# Patient Record
Sex: FEMALE | Race: WHITE | Employment: FULL TIME | URBAN - METROPOLITAN AREA
[De-identification: names, ages, dates, MRNs, and addresses within clinical notes are randomized per-mention and may not be internally consistent; named-entity substitution may affect disease eponyms.]

---

## 2020-02-27 ENCOUNTER — OFFICE VISIT (OUTPATIENT)
Dept: SURGERY | Facility: CLINIC | Age: 65
End: 2020-02-27
Payer: COMMERCIAL

## 2020-02-27 VITALS
RESPIRATION RATE: 17 BRPM | TEMPERATURE: 98 F | HEIGHT: 59 IN | DIASTOLIC BLOOD PRESSURE: 86 MMHG | SYSTOLIC BLOOD PRESSURE: 152 MMHG | BODY MASS INDEX: 21.17 KG/M2 | WEIGHT: 105 LBS | HEART RATE: 85 BPM

## 2020-02-27 DIAGNOSIS — R92.8 ABNORMAL MAMMOGRAM OF RIGHT BREAST: ICD-10-CM

## 2020-02-27 DIAGNOSIS — N63.10 BREAST MASS, RIGHT: Primary | ICD-10-CM

## 2020-02-27 DIAGNOSIS — R92.8 ABNORMAL ULTRASOUND OF BREAST: ICD-10-CM

## 2020-02-27 PROCEDURE — 99244 OFF/OP CNSLTJ NEW/EST MOD 40: CPT | Performed by: SURGERY

## 2020-02-27 RX ORDER — SULFAMETHOXAZOLE AND TRIMETHOPRIM 800; 160 MG/1; MG/1
1 TABLET ORAL 2 TIMES DAILY
COMMUNITY
End: 2020-03-13

## 2020-02-28 NOTE — H&P (VIEW-ONLY)
New Patient Visit Note       Active Problems      1. Breast mass, right    2. Abnormal mammogram of right breast    3.  Abnormal ultrasound of breast        Chief Complaint   Patient presents with:  Breast Problem: Np, referred by [facility] breast Biop and vomiting. Genitourinary: Negative for difficulty urinating, dysuria and frequency. Musculoskeletal: Negative for joint swelling and neck pain. Skin: Negative for color change and rash.    Neurological: Negative for dizziness, syncope and light-hea tenderness elicited. Left Breast-no dominant masses are noted   there is no abnormal dimpling or change in contour. There is no skin erythema, edema or peau d'orange. There is no nipple inversion and no discharge noted spontaneous or elicited.   No focal appropriate. Plan    Ultrasound core biopsy right breast x2 for tissue diagnosis         The risks, benefits, complications, possible outcomes and alternatives of the procedure were explained to the patient in detail.    Expected postoperative pain, r

## 2020-02-28 NOTE — H&P
New Patient Visit Note       Active Problems      1. Breast mass, right    2. Abnormal mammogram of right breast    3.  Abnormal ultrasound of breast        Chief Complaint   Patient presents with:  Breast Problem: Np, referred by [facility] breast Biop and vomiting. Genitourinary: Negative for difficulty urinating, dysuria and frequency. Musculoskeletal: Negative for joint swelling and neck pain. Skin: Negative for color change and rash.    Neurological: Negative for dizziness, syncope and light-hea tenderness elicited. Left Breast-no dominant masses are noted   there is no abnormal dimpling or change in contour. There is no skin erythema, edema or peau d'orange. There is no nipple inversion and no discharge noted spontaneous or elicited.   No focal appropriate. Plan    Ultrasound core biopsy right breast x2 for tissue diagnosis         The risks, benefits, complications, possible outcomes and alternatives of the procedure were explained to the patient in detail.    Expected postoperative pain, r

## 2020-03-03 ENCOUNTER — TELEPHONE (OUTPATIENT)
Dept: SURGERY | Facility: CLINIC | Age: 65
End: 2020-03-03

## 2020-03-03 ENCOUNTER — NURSE ONLY (OUTPATIENT)
Dept: MAMMOGRAPHY | Facility: HOSPITAL | Age: 65
End: 2020-03-03

## 2020-03-03 NOTE — IMAGING NOTE
Breast images/reports from [address] [place] [address] were reviewed by Dr Chelsie Marks and recommendation was made for pt to have a U/S right breast biopsy x 2 sites and right axillary lymph-node biopsy.  This information was relayed to Dr Luis Eduardo Ferguson and she states she concurs and will plac

## 2020-03-03 NOTE — TELEPHONE ENCOUNTER
Called by radiology-patient did have right breast ultrasound and right axillary ultrasound. 2 lesions noted in the right breast for which ultrasound core biopsy is recommended. Also right axillary ultrasound noted enlarged axillary lymph nodes.   Will add

## 2020-03-04 ENCOUNTER — TELEPHONE (OUTPATIENT)
Dept: SURGERY | Facility: CLINIC | Age: 65
End: 2020-03-04

## 2020-03-04 ENCOUNTER — TELEPHONE (OUTPATIENT)
Dept: MAMMOGRAPHY | Facility: HOSPITAL | Age: 65
End: 2020-03-04

## 2020-03-04 NOTE — TELEPHONE ENCOUNTER
Spoke with pt re:Right breast Ultrasound biopsy x 2 sites and right axillary lymph-node biopsy at the recommendation of Dr Cecily Lyons. Previous images and reports uploaded in PACS and reviewed by Dr Joe Douglas, BIRADS 5.  Procedure reviewed and written instructions

## 2020-03-04 NOTE — TELEPHONE ENCOUNTER
Pt calling back and has gone through all instructions for breast bx with the radiology dept. Asked that once she schedules this to call for an appt post bx for results. Pt ARIC.

## 2020-03-11 ENCOUNTER — HOSPITAL ENCOUNTER (OUTPATIENT)
Dept: MAMMOGRAPHY | Facility: HOSPITAL | Age: 65
Discharge: HOME | End: 2020-03-11
Attending: SURGERY
Payer: COMMERCIAL

## 2020-03-11 DIAGNOSIS — R92.8 ABNORMAL MAMMOGRAM: ICD-10-CM

## 2020-03-11 DIAGNOSIS — N63.0 BREAST MASS: ICD-10-CM

## 2020-03-11 PROCEDURE — 38505 NEEDLE BIOPSY LYMPH NODES: CPT | Performed by: SURGERY

## 2020-03-11 PROCEDURE — 19083 BX BREAST 1ST LESION US IMAG: CPT | Performed by: SURGERY

## 2020-03-11 PROCEDURE — 88305 TISSUE EXAM BY PATHOLOGIST: CPT | Performed by: SURGERY

## 2020-03-11 PROCEDURE — 19084 BX BREAST ADD LESION US IMAG: CPT | Performed by: SURGERY

## 2020-03-11 PROCEDURE — 76942 ECHO GUIDE FOR BIOPSY: CPT | Performed by: SURGERY

## 2020-03-11 PROCEDURE — 77065 DX MAMMO INCL CAD UNI: CPT | Performed by: SURGERY

## 2020-03-11 PROCEDURE — 88360 TUMOR IMMUNOHISTOCHEM/MANUAL: CPT | Performed by: SURGERY

## 2020-03-13 ENCOUNTER — NURSE NAVIGATOR ENCOUNTER (OUTPATIENT)
Dept: HEMATOLOGY/ONCOLOGY | Facility: HOSPITAL | Age: 65
End: 2020-03-13

## 2020-03-13 ENCOUNTER — OFFICE VISIT (OUTPATIENT)
Dept: SURGERY | Facility: CLINIC | Age: 65
End: 2020-03-13
Payer: COMMERCIAL

## 2020-03-13 VITALS
RESPIRATION RATE: 16 BRPM | HEART RATE: 91 BPM | HEIGHT: 60.5 IN | DIASTOLIC BLOOD PRESSURE: 92 MMHG | WEIGHT: 105 LBS | BODY MASS INDEX: 20.08 KG/M2 | SYSTOLIC BLOOD PRESSURE: 152 MMHG | TEMPERATURE: 98 F

## 2020-03-13 DIAGNOSIS — C50.911 MALIGNANT NEOPLASM OF RIGHT FEMALE BREAST, UNSPECIFIED ESTROGEN RECEPTOR STATUS, UNSPECIFIED SITE OF BREAST (HCC): Primary | ICD-10-CM

## 2020-03-13 DIAGNOSIS — C50.911 BREAST CANCER METASTASIZED TO AXILLARY LYMPH NODE, RIGHT (HCC): ICD-10-CM

## 2020-03-13 DIAGNOSIS — C77.3 BREAST CANCER METASTASIZED TO AXILLARY LYMPH NODE, RIGHT (HCC): ICD-10-CM

## 2020-03-13 PROCEDURE — 99244 OFF/OP CNSLTJ NEW/EST MOD 40: CPT | Performed by: SURGERY

## 2020-03-13 NOTE — PROGRESS NOTES
Phoned patient to discuss my role as breast program navigator and to assist with scheduling with medical oncology. Contact information provided. Will await patient's return phone call. Pt returned phone call.  Introduced myself as the breast program anju

## 2020-03-17 ENCOUNTER — MED REC SCAN ONLY (OUTPATIENT)
Dept: SURGERY | Facility: CLINIC | Age: 65
End: 2020-03-17

## 2020-03-17 ENCOUNTER — TELEPHONE (OUTPATIENT)
Dept: SURGERY | Facility: CLINIC | Age: 65
End: 2020-03-17

## 2020-03-17 ENCOUNTER — NURSE NAVIGATOR ENCOUNTER (OUTPATIENT)
Dept: HEMATOLOGY/ONCOLOGY | Facility: HOSPITAL | Age: 65
End: 2020-03-17

## 2020-03-17 ENCOUNTER — OFFICE VISIT (OUTPATIENT)
Dept: HEMATOLOGY/ONCOLOGY | Facility: HOSPITAL | Age: 65
End: 2020-03-17
Attending: INTERNAL MEDICINE
Payer: COMMERCIAL

## 2020-03-17 VITALS
HEART RATE: 86 BPM | BODY MASS INDEX: 21.41 KG/M2 | DIASTOLIC BLOOD PRESSURE: 91 MMHG | WEIGHT: 102 LBS | HEIGHT: 57.72 IN | TEMPERATURE: 99 F | RESPIRATION RATE: 16 BRPM | SYSTOLIC BLOOD PRESSURE: 180 MMHG | OXYGEN SATURATION: 96 %

## 2020-03-17 DIAGNOSIS — R74.8 ELEVATED ALKALINE PHOSPHATASE IN NEWBORN: ICD-10-CM

## 2020-03-17 DIAGNOSIS — R79.89 ELEVATED LFTS: ICD-10-CM

## 2020-03-17 DIAGNOSIS — C50.811 MALIGNANT NEOPLASM OF OVERLAPPING SITES OF RIGHT BREAST IN FEMALE, ESTROGEN RECEPTOR POSITIVE (HCC): Primary | ICD-10-CM

## 2020-03-17 DIAGNOSIS — C50.911 BREAST CANCER METASTASIZED TO AXILLARY LYMPH NODE, RIGHT (HCC): ICD-10-CM

## 2020-03-17 DIAGNOSIS — C77.3 BREAST CANCER METASTASIZED TO AXILLARY LYMPH NODE, RIGHT (HCC): ICD-10-CM

## 2020-03-17 DIAGNOSIS — L53.9 BREAST ERYTHEMA: ICD-10-CM

## 2020-03-17 DIAGNOSIS — Z17.0 MALIGNANT NEOPLASM OF OVERLAPPING SITES OF RIGHT BREAST IN FEMALE, ESTROGEN RECEPTOR POSITIVE (HCC): Primary | ICD-10-CM

## 2020-03-17 LAB
ALBUMIN SERPL-MCNC: 3.7 G/DL (ref 3.4–5)
ALBUMIN/GLOB SERPL: 1 {RATIO} (ref 1–2)
ALP LIVER SERPL-CCNC: 331 U/L (ref 50–130)
ALT SERPL-CCNC: 369 U/L (ref 13–56)
ANION GAP SERPL CALC-SCNC: 5 MMOL/L (ref 0–18)
AST SERPL-CCNC: 68 U/L (ref 15–37)
BASOPHILS # BLD AUTO: 0.03 X10(3) UL (ref 0–0.2)
BASOPHILS NFR BLD AUTO: 0.4 %
BILIRUB SERPL-MCNC: 0.8 MG/DL (ref 0.1–2)
BRCA1 C.185DELAG BLD/T QL: 47.3 U/ML (ref ?–38)
BUN BLD-MCNC: 16 MG/DL (ref 7–18)
BUN/CREAT SERPL: 20.3 (ref 10–20)
CALCIUM BLD-MCNC: 9.1 MG/DL (ref 8.5–10.1)
CHLORIDE SERPL-SCNC: 106 MMOL/L (ref 98–112)
CO2 SERPL-SCNC: 28 MMOL/L (ref 21–32)
CREAT BLD-MCNC: 0.79 MG/DL (ref 0.55–1.02)
DEPRECATED RDW RBC AUTO: 47.2 FL (ref 35.1–46.3)
EOSINOPHIL # BLD AUTO: 0.06 X10(3) UL (ref 0–0.7)
EOSINOPHIL NFR BLD AUTO: 0.8 %
ERYTHROCYTE [DISTWIDTH] IN BLOOD BY AUTOMATED COUNT: 14.7 % (ref 11–15)
GLOBULIN PLAS-MCNC: 3.8 G/DL (ref 2.8–4.4)
GLUCOSE BLD-MCNC: 89 MG/DL (ref 70–99)
HCT VFR BLD AUTO: 41.9 % (ref 35–48)
HGB BLD-MCNC: 13.4 G/DL (ref 12–16)
IMM GRANULOCYTES # BLD AUTO: 0.04 X10(3) UL (ref 0–1)
IMM GRANULOCYTES NFR BLD: 0.5 %
LDH SERPL L TO P-CCNC: 180 U/L
LYMPHOCYTES # BLD AUTO: 1.75 X10(3) UL (ref 1–4)
LYMPHOCYTES NFR BLD AUTO: 23.4 %
M PROTEIN MFR SERPL ELPH: 7.5 G/DL (ref 6.4–8.2)
MCH RBC QN AUTO: 29 PG (ref 26–34)
MCHC RBC AUTO-ENTMCNC: 32 G/DL (ref 31–37)
MCV RBC AUTO: 90.7 FL (ref 80–100)
MONOCYTES # BLD AUTO: 0.71 X10(3) UL (ref 0.1–1)
MONOCYTES NFR BLD AUTO: 9.5 %
NEUTROPHILS # BLD AUTO: 4.88 X10 (3) UL (ref 1.5–7.7)
NEUTROPHILS # BLD AUTO: 4.88 X10(3) UL (ref 1.5–7.7)
NEUTROPHILS NFR BLD AUTO: 65.4 %
OSMOLALITY SERPL CALC.SUM OF ELEC: 289 MOSM/KG (ref 275–295)
PLATELET # BLD AUTO: 365 10(3)UL (ref 150–450)
POTASSIUM SERPL-SCNC: 4.2 MMOL/L (ref 3.5–5.1)
RBC # BLD AUTO: 4.62 X10(6)UL (ref 3.8–5.3)
SODIUM SERPL-SCNC: 139 MMOL/L (ref 136–145)
WBC # BLD AUTO: 7.5 X10(3) UL (ref 4–11)

## 2020-03-17 PROCEDURE — 99245 OFF/OP CONSLTJ NEW/EST HI 55: CPT | Performed by: INTERNAL MEDICINE

## 2020-03-17 NOTE — CONSULTS
Cancer Center Report of Consultation    Patient Name: Chad Guallpa   YOB: 1955   Medical Record Number: [id number]   CSN: [id number]   Consulting Physician: Adolfo Hill MD  Referring Physician(s): Benjy Abbasi  Date of Consultation: History:  OB History    Comment: Menarche at age 8  Menopause at age 48  First full term pregnancy at age 41    Psychosocial History:  Social History    Socioeconomic History      Marital status:       Spouse name: Not on file      Number of childr medications on file. Review of Systems:    Constitutional: Negative for anorexia, fatigue, fevers, chills, night sweats and weight loss. Eyes: Negative for visual disturbance, irritation and redness.   Respiratory: Negative for cough, hemoptysis, chest deficit. Lymphatics: There is no palpable lymphadenopathy throughout in the cervical, supraclavicular, or axillary regions. There is fullness in the right axilla.     Labs reviewed at this visit:  Lab Results   Component Value Date    WBC 7.5 03/17/2020 Multiple samples were obtained using the vacuum-assisted device. A clip was then deployed and post procedure mammogram demonstrated the clip to be in the appropriate position.  Post procedural mammogram performed on separate digital equipment show the echocardiogram.     If she proves to have distant metastases then we would have to reconsider the plan. She has a good understanding of the plan and all questions were answered.     Visit start time 11:26 AM     Surinder Yusuf MD

## 2020-03-17 NOTE — PROGRESS NOTES
Patient is here today for Consult  with Dr Azeb Hicks . Patient denies pain. Medication list and medical history were reviewed and updated.      Education Record    Learner:  Patient    Disease / Diagnosis: Consult Breast Clinic    Barriers / Limitations:  None

## 2020-03-17 NOTE — TELEPHONE ENCOUNTER
Spoke to Kapil andrews today after tumor board conference  Kapil andrews did see Dr. Rik Lara earlier today to discuss metastatic work-up  Patient is scheduled for Port-A-Cath placement March 25 next week  Patient is a candidate for genetic testing due to Tressa castro

## 2020-03-18 ENCOUNTER — MED REC SCAN ONLY (OUTPATIENT)
Dept: SURGERY | Facility: CLINIC | Age: 65
End: 2020-03-18

## 2020-03-24 ENCOUNTER — NURSE NAVIGATOR ENCOUNTER (OUTPATIENT)
Dept: HEMATOLOGY/ONCOLOGY | Facility: HOSPITAL | Age: 65
End: 2020-03-24

## 2020-03-24 DIAGNOSIS — C50.811 MALIGNANT NEOPLASM OF OVERLAPPING SITES OF RIGHT BREAST IN FEMALE, ESTROGEN RECEPTOR POSITIVE (HCC): Primary | ICD-10-CM

## 2020-03-24 DIAGNOSIS — R74.8 ELEVATED ALKALINE PHOSPHATASE IN NEWBORN: ICD-10-CM

## 2020-03-24 DIAGNOSIS — R79.89 ELEVATED LFTS: ICD-10-CM

## 2020-03-24 DIAGNOSIS — Z17.0 MALIGNANT NEOPLASM OF OVERLAPPING SITES OF RIGHT BREAST IN FEMALE, ESTROGEN RECEPTOR POSITIVE (HCC): Primary | ICD-10-CM

## 2020-03-24 DIAGNOSIS — C50.911 BREAST CANCER METASTASIZED TO AXILLARY LYMPH NODE, RIGHT (HCC): ICD-10-CM

## 2020-03-24 DIAGNOSIS — C77.3 BREAST CANCER METASTASIZED TO AXILLARY LYMPH NODE, RIGHT (HCC): ICD-10-CM

## 2020-03-24 NOTE — PROGRESS NOTES
Orders changed to CT and bone scan for evaluation of metastatic disease. Will proceed with PAC placement on 3/25, and staging scheduled for 3/27/2020. Phoned patient to discuss instructions and directions.  Pt has PAC insertion tomorrow and will  ora

## 2020-03-25 ENCOUNTER — TELEPHONE (OUTPATIENT)
Dept: HEMATOLOGY/ONCOLOGY | Facility: HOSPITAL | Age: 65
End: 2020-03-25

## 2020-03-25 ENCOUNTER — APPOINTMENT (OUTPATIENT)
Dept: GENERAL RADIOLOGY | Facility: HOSPITAL | Age: 65
End: 2020-03-25
Attending: SURGERY
Payer: COMMERCIAL

## 2020-03-25 ENCOUNTER — MED REC SCAN ONLY (OUTPATIENT)
Dept: SURGERY | Facility: CLINIC | Age: 65
End: 2020-03-25

## 2020-03-25 ENCOUNTER — HOSPITAL ENCOUNTER (OUTPATIENT)
Facility: HOSPITAL | Age: 65
Setting detail: HOSPITAL OUTPATIENT SURGERY
Discharge: HOME | End: 2020-03-25
Attending: SURGERY | Admitting: SURGERY
Payer: COMMERCIAL

## 2020-03-25 ENCOUNTER — ANESTHESIA (OUTPATIENT)
Dept: SURGERY | Facility: HOSPITAL | Age: 65
End: 2020-03-25
Payer: COMMERCIAL

## 2020-03-25 ENCOUNTER — ANESTHESIA EVENT (OUTPATIENT)
Dept: SURGERY | Facility: HOSPITAL | Age: 65
End: 2020-03-25
Payer: COMMERCIAL

## 2020-03-25 VITALS
TEMPERATURE: 100 F | SYSTOLIC BLOOD PRESSURE: 143 MMHG | DIASTOLIC BLOOD PRESSURE: 88 MMHG | OXYGEN SATURATION: 99 % | WEIGHT: 103.19 LBS | HEART RATE: 71 BPM | BODY MASS INDEX: 21.66 KG/M2 | RESPIRATION RATE: 18 BRPM | HEIGHT: 58 IN

## 2020-03-25 DIAGNOSIS — C50.911 MALIGNANT NEOPLASM OF RIGHT FEMALE BREAST, UNSPECIFIED ESTROGEN RECEPTOR STATUS, UNSPECIFIED SITE OF BREAST (HCC): ICD-10-CM

## 2020-03-25 PROCEDURE — 02HV33Z INSERTION OF INFUSION DEVICE INTO SUPERIOR VENA CAVA, PERCUTANEOUS APPROACH: ICD-10-PCS | Performed by: SURGERY

## 2020-03-25 PROCEDURE — 0JHF3XZ INSERTION OF TUNNELED VASCULAR ACCESS DEVICE INTO LEFT UPPER ARM SUBCUTANEOUS TISSUE AND FASCIA, PERCUTANEOUS APPROACH: ICD-10-PCS | Performed by: SURGERY

## 2020-03-25 PROCEDURE — 77001 FLUOROGUIDE FOR VEIN DEVICE: CPT | Performed by: SURGERY

## 2020-03-25 PROCEDURE — 71045 X-RAY EXAM CHEST 1 VIEW: CPT | Performed by: SURGERY

## 2020-03-25 DEVICE — POWERPORT CLEARVUE SLIM WITH SMOOTH SEPTUM, 8F POLYURETHANE CATHETER, SUTURE PLUGS, INTERMEDIATE KIT
Type: IMPLANTABLE DEVICE | Site: CHEST | Status: FUNCTIONAL
Brand: POWERPORT CLEARVUE

## 2020-03-25 RX ORDER — CEFAZOLIN SODIUM/WATER 2 G/20 ML
2 SYRINGE (ML) INTRAVENOUS ONCE
Status: COMPLETED | OUTPATIENT
Start: 2020-03-25 | End: 2020-03-25

## 2020-03-25 RX ORDER — NALOXONE HYDROCHLORIDE 0.4 MG/ML
80 INJECTION, SOLUTION INTRAMUSCULAR; INTRAVENOUS; SUBCUTANEOUS AS NEEDED
Status: DISCONTINUED | OUTPATIENT
Start: 2020-03-25 | End: 2020-03-25

## 2020-03-25 RX ORDER — SODIUM CHLORIDE, SODIUM LACTATE, POTASSIUM CHLORIDE, CALCIUM CHLORIDE 600; 310; 30; 20 MG/100ML; MG/100ML; MG/100ML; MG/100ML
INJECTION, SOLUTION INTRAVENOUS CONTINUOUS
Status: DISCONTINUED | OUTPATIENT
Start: 2020-03-25 | End: 2020-03-25

## 2020-03-25 RX ORDER — HEPARIN SODIUM 5000 [USP'U]/ML
5000 INJECTION, SOLUTION INTRAVENOUS; SUBCUTANEOUS ONCE
Status: COMPLETED | OUTPATIENT
Start: 2020-03-25 | End: 2020-03-25

## 2020-03-25 RX ORDER — BUPIVACAINE HYDROCHLORIDE 5 MG/ML
INJECTION, SOLUTION EPIDURAL; INTRACAUDAL AS NEEDED
Status: DISCONTINUED | OUTPATIENT
Start: 2020-03-25 | End: 2020-03-25 | Stop reason: HOSPADM

## 2020-03-25 RX ORDER — LIDOCAINE HYDROCHLORIDE AND EPINEPHRINE 10; 10 MG/ML; UG/ML
INJECTION, SOLUTION INFILTRATION; PERINEURAL AS NEEDED
Status: DISCONTINUED | OUTPATIENT
Start: 2020-03-25 | End: 2020-03-25 | Stop reason: HOSPADM

## 2020-03-25 RX ORDER — HYDROCODONE BITARTRATE AND ACETAMINOPHEN 5; 325 MG/1; MG/1
2 TABLET ORAL AS NEEDED
Status: DISCONTINUED | OUTPATIENT
Start: 2020-03-25 | End: 2020-03-25

## 2020-03-25 RX ORDER — HYDROMORPHONE HYDROCHLORIDE 1 MG/ML
0.4 INJECTION, SOLUTION INTRAMUSCULAR; INTRAVENOUS; SUBCUTANEOUS EVERY 5 MIN PRN
Status: DISCONTINUED | OUTPATIENT
Start: 2020-03-25 | End: 2020-03-25

## 2020-03-25 RX ORDER — LIDOCAINE HYDROCHLORIDE 10 MG/ML
INJECTION, SOLUTION EPIDURAL; INFILTRATION; INTRACAUDAL; PERINEURAL AS NEEDED
Status: DISCONTINUED | OUTPATIENT
Start: 2020-03-25 | End: 2020-03-25 | Stop reason: SURG

## 2020-03-25 RX ORDER — ONDANSETRON 2 MG/ML
4 INJECTION INTRAMUSCULAR; INTRAVENOUS AS NEEDED
Status: DISCONTINUED | OUTPATIENT
Start: 2020-03-25 | End: 2020-03-25

## 2020-03-25 RX ORDER — ACETAMINOPHEN 500 MG
1000 TABLET ORAL ONCE
Status: DISCONTINUED | OUTPATIENT
Start: 2020-03-25 | End: 2020-03-25

## 2020-03-25 RX ORDER — ACETAMINOPHEN 500 MG
1000 TABLET ORAL ONCE AS NEEDED
Status: DISCONTINUED | OUTPATIENT
Start: 2020-03-25 | End: 2020-03-25

## 2020-03-25 RX ORDER — ACETAMINOPHEN 500 MG
1000 TABLET ORAL EVERY 6 HOURS PRN
COMMUNITY
End: 2020-07-28 | Stop reason: ALTCHOICE

## 2020-03-25 RX ORDER — HYDROCODONE BITARTRATE AND ACETAMINOPHEN 5; 325 MG/1; MG/1
1 TABLET ORAL AS NEEDED
Status: DISCONTINUED | OUTPATIENT
Start: 2020-03-25 | End: 2020-03-25

## 2020-03-25 RX ADMIN — LIDOCAINE HYDROCHLORIDE 50 MG: 10 INJECTION, SOLUTION EPIDURAL; INFILTRATION; INTRACAUDAL; PERINEURAL at 07:54:00

## 2020-03-25 RX ADMIN — CEFAZOLIN SODIUM/WATER 2 G: 2 G/20 ML SYRINGE (ML) INTRAVENOUS at 07:51:00

## 2020-03-25 NOTE — TELEPHONE ENCOUNTER
Sheila called saying she got a call that she needed to reschedule her scan due to the virus if it is not an urgent matter. She wanted to verify with  that this wasn't urgent, or if he needed her to get it done on Monday.  Please call

## 2020-03-25 NOTE — OR NURSING
PATIENT WAS INSTRUCTED ABOUT PROCEDURES TO BE DONE Friday.  TOLD WHAT BUILDING TO GO TO [facility] TIME TO ARRIVE AND TIME OF PROCEDURES. VERBALIZED UNDERSTANDING

## 2020-03-25 NOTE — ANESTHESIA POSTPROCEDURE EVALUATION
Barbie 74 Patient Status:  Hospital Outpatient Surgery   Age/Gender 60year old female MRN [id number]   [facility] SURGERY Attending Biju Choi, [address] [facility] [address] Day # 0 PCP Dajuan Shankar MD       Anesthesia Post-o

## 2020-03-25 NOTE — BRIEF OP NOTE
Pre-Operative Diagnosis: Malignant neoplasm of right female breast, unspecified estrogen receptor status, [facility] [C50.911]     Post-Operative Diagnosis: Malignant neoplasm of right female breast, unspecified estrogen receptor stat

## 2020-03-25 NOTE — ANESTHESIA PREPROCEDURE EVALUATION
PRE-OP EVALUATION    Patient Name: Alberto Singh    Pre-op Diagnosis: Malignant neoplasm of right female breast, unspecified estrogen receptor status, unspecified site of breast [facility] [C50.911]    Procedure(s):  INSERTION LEFT PORTACATH    Surgeon(s) 41.9 03/17/2020    MCV 90.7 03/17/2020    MCH 29.0 03/17/2020    MCHC 32.0 03/17/2020    RDW 14.7 03/17/2020    .0 03/17/2020     Lab Results   Component Value Date     03/17/2020    K 4.2 03/17/2020     03/17/2020    CO2 28.0 03/17/2020

## 2020-03-25 NOTE — OPERATIVE REPORT
[facility]  Operative Note    [place] Location: OR   CSN [id number] MRN [id number]   Admission Date 3/25/2020 Operation Date 3/25/2020   Attending Physician Meliza Kinney MD Operating Physician Joanie Coombs MD     Date of procedure:   3 HER-2 positive    Case was discussed in multidisciplinary tumor board conference. At this time the patient is a candidate for neoadjuvant chemotherapy. Chace Price is admitted today for placement of Port-A-Cath.     Discussed with patient:  The potential risks into the subcutaneous pocket using a nonabsorbable suture. All skin incisions were closed with 3-0 and 5-0 Vicryl in a layered subcuticular manner. All sponge instrument and needle counts were correct at the conclusion of the procedure.  The patient did julieth

## 2020-03-27 ENCOUNTER — HOSPITAL ENCOUNTER (OUTPATIENT)
Dept: NUCLEAR MEDICINE | Facility: HOSPITAL | Age: 65
Discharge: HOME | End: 2020-03-27
Attending: INTERNAL MEDICINE
Payer: COMMERCIAL

## 2020-03-27 ENCOUNTER — HOSPITAL ENCOUNTER (OUTPATIENT)
Dept: CT IMAGING | Facility: HOSPITAL | Age: 65
Discharge: HOME | End: 2020-03-27
Attending: INTERNAL MEDICINE
Payer: COMMERCIAL

## 2020-03-27 DIAGNOSIS — R79.89 ELEVATED LFTS: ICD-10-CM

## 2020-03-27 DIAGNOSIS — C50.811 MALIGNANT NEOPLASM OF OVERLAPPING SITES OF RIGHT BREAST IN FEMALE, ESTROGEN RECEPTOR POSITIVE (HCC): ICD-10-CM

## 2020-03-27 DIAGNOSIS — R74.8 ELEVATED ALKALINE PHOSPHATASE IN NEWBORN: ICD-10-CM

## 2020-03-27 DIAGNOSIS — Z17.0 MALIGNANT NEOPLASM OF OVERLAPPING SITES OF RIGHT BREAST IN FEMALE, ESTROGEN RECEPTOR POSITIVE (HCC): ICD-10-CM

## 2020-03-27 PROCEDURE — 74177 CT ABD & PELVIS W/CONTRAST: CPT | Performed by: INTERNAL MEDICINE

## 2020-03-27 PROCEDURE — 78306 BONE IMAGING WHOLE BODY: CPT | Performed by: INTERNAL MEDICINE

## 2020-03-27 PROCEDURE — 78832 RP LOCLZJ TUM SPECT W/CT 2: CPT | Performed by: INTERNAL MEDICINE

## 2020-03-27 PROCEDURE — 71260 CT THORAX DX C+: CPT | Performed by: INTERNAL MEDICINE

## 2020-03-30 ENCOUNTER — HOSPITAL ENCOUNTER (OUTPATIENT)
Dept: CV DIAGNOSTICS | Facility: HOSPITAL | Age: 65
Discharge: HOME | End: 2020-03-30
Attending: INTERNAL MEDICINE
Payer: COMMERCIAL

## 2020-03-30 ENCOUNTER — NURSE NAVIGATOR ENCOUNTER (OUTPATIENT)
Dept: HEMATOLOGY/ONCOLOGY | Facility: HOSPITAL | Age: 65
End: 2020-03-30

## 2020-03-30 DIAGNOSIS — C50.911 BREAST CANCER METASTASIZED TO AXILLARY LYMPH NODE, RIGHT (HCC): ICD-10-CM

## 2020-03-30 DIAGNOSIS — C50.811 MALIGNANT NEOPLASM OF OVERLAPPING SITES OF RIGHT BREAST IN FEMALE, ESTROGEN RECEPTOR POSITIVE (HCC): ICD-10-CM

## 2020-03-30 DIAGNOSIS — Z17.0 MALIGNANT NEOPLASM OF OVERLAPPING SITES OF RIGHT BREAST IN FEMALE, ESTROGEN RECEPTOR POSITIVE (HCC): ICD-10-CM

## 2020-03-30 DIAGNOSIS — C77.3 BREAST CANCER METASTASIZED TO AXILLARY LYMPH NODE, RIGHT (HCC): ICD-10-CM

## 2020-03-30 PROCEDURE — 93306 TTE W/DOPPLER COMPLETE: CPT | Performed by: INTERNAL MEDICINE

## 2020-03-30 NOTE — PROGRESS NOTES
LVM for patient to ensure that she received Dr. Yvonne Crow message regarding bone scan being negative. Echo is today. Plan to proceed with [facility], requested return phone call, contact information provided. Will assist in scheduling chemo ed and first treatment.

## 2020-03-31 ENCOUNTER — HOSPITAL ENCOUNTER (OUTPATIENT)
Dept: NUCLEAR MEDICINE | Facility: HOSPITAL | Age: 65
End: 2020-03-31
Attending: INTERNAL MEDICINE
Payer: COMMERCIAL

## 2020-04-01 ENCOUNTER — VIRTUAL PHONE E/M (OUTPATIENT)
Dept: HEMATOLOGY/ONCOLOGY | Facility: HOSPITAL | Age: 65
End: 2020-04-01
Attending: INTERNAL MEDICINE
Payer: COMMERCIAL

## 2020-04-01 DIAGNOSIS — C77.3 BREAST CANCER METASTASIZED TO AXILLARY LYMPH NODE, RIGHT (HCC): ICD-10-CM

## 2020-04-01 DIAGNOSIS — Z17.0 MALIGNANT NEOPLASM OF OVERLAPPING SITES OF RIGHT BREAST IN FEMALE, ESTROGEN RECEPTOR POSITIVE (HCC): Primary | ICD-10-CM

## 2020-04-01 DIAGNOSIS — C50.911 BREAST CANCER METASTASIZED TO AXILLARY LYMPH NODE, RIGHT (HCC): ICD-10-CM

## 2020-04-01 DIAGNOSIS — C50.811 MALIGNANT NEOPLASM OF OVERLAPPING SITES OF RIGHT BREAST IN FEMALE, ESTROGEN RECEPTOR POSITIVE (HCC): Primary | ICD-10-CM

## 2020-04-01 RX ORDER — PROCHLORPERAZINE MALEATE 10 MG
10 TABLET ORAL EVERY 6 HOURS PRN
Qty: 30 TABLET | Refills: 3 | Status: SHIPPED | OUTPATIENT
Start: 2020-04-01 | End: 2021-06-03 | Stop reason: ALTCHOICE

## 2020-04-01 RX ORDER — DEXAMETHASONE 4 MG/1
8 TABLET ORAL 2 TIMES DAILY WITH MEALS
Qty: 24 TABLET | Refills: 4 | Status: SHIPPED | OUTPATIENT
Start: 2020-04-01 | End: 2020-08-27

## 2020-04-01 RX ORDER — ONDANSETRON HYDROCHLORIDE 8 MG/1
8 TABLET, FILM COATED ORAL EVERY 8 HOURS PRN
Qty: 30 TABLET | Refills: 3 | Status: SHIPPED | OUTPATIENT
Start: 2020-04-01 | End: 2021-12-09

## 2020-04-01 NOTE — TELEPHONE ENCOUNTER
Chemotherapy Education    Learner:  Patient    Barriers / Limitations:  None    Chemotherapy education goals:  · Learn the drug names  · Administration schedule  · Routes of administration  · Treatment setting    Drug names:  Docetaxel, carboplatin, trastu MD/RN of any numbness / tingling at next visit:  Needs reinforcement    Comments: No baseline neuropathy, will monitor.     Treatment Effects on the Bladder and Kidneys:    Function of the kidneys and bladder:  Needs reinforcement    Signs / symptoms of hem Resources Information sheet    Patient was given ample opportunity to ask questions. All questions and concerns addressed. We discussed self care techniques, symptom management, fluid, diet, and activities.

## 2020-04-02 ENCOUNTER — OFFICE VISIT (OUTPATIENT)
Dept: HEMATOLOGY/ONCOLOGY | Facility: HOSPITAL | Age: 65
End: 2020-04-02
Attending: INTERNAL MEDICINE
Payer: COMMERCIAL

## 2020-04-02 ENCOUNTER — SOCIAL WORK SERVICES (OUTPATIENT)
Dept: HEMATOLOGY/ONCOLOGY | Facility: HOSPITAL | Age: 65
End: 2020-04-02

## 2020-04-02 VITALS
HEIGHT: 57.72 IN | WEIGHT: 103 LBS | HEART RATE: 84 BPM | TEMPERATURE: 100 F | RESPIRATION RATE: 18 BRPM | OXYGEN SATURATION: 99 % | SYSTOLIC BLOOD PRESSURE: 157 MMHG | DIASTOLIC BLOOD PRESSURE: 92 MMHG | BODY MASS INDEX: 21.62 KG/M2

## 2020-04-02 DIAGNOSIS — C77.3 BREAST CANCER METASTASIZED TO AXILLARY LYMPH NODE, RIGHT (HCC): ICD-10-CM

## 2020-04-02 DIAGNOSIS — C50.811 MALIGNANT NEOPLASM OF OVERLAPPING SITES OF RIGHT BREAST IN FEMALE, ESTROGEN RECEPTOR POSITIVE (HCC): Primary | ICD-10-CM

## 2020-04-02 DIAGNOSIS — C50.911 BREAST CANCER METASTASIZED TO AXILLARY LYMPH NODE, RIGHT (HCC): ICD-10-CM

## 2020-04-02 DIAGNOSIS — R79.89 ELEVATED LFTS: ICD-10-CM

## 2020-04-02 DIAGNOSIS — Z17.0 MALIGNANT NEOPLASM OF OVERLAPPING SITES OF RIGHT BREAST IN FEMALE, ESTROGEN RECEPTOR POSITIVE (HCC): Primary | ICD-10-CM

## 2020-04-02 PROCEDURE — 99214 OFFICE O/P EST MOD 30 MIN: CPT | Performed by: INTERNAL MEDICINE

## 2020-04-02 PROCEDURE — 85025 COMPLETE CBC W/AUTO DIFF WBC: CPT

## 2020-04-02 PROCEDURE — 96417 CHEMO IV INFUS EACH ADDL SEQ: CPT

## 2020-04-02 PROCEDURE — 80053 COMPREHEN METABOLIC PANEL: CPT

## 2020-04-02 PROCEDURE — 96377 APPLICATON ON-BODY INJECTOR: CPT

## 2020-04-02 PROCEDURE — 96413 CHEMO IV INFUSION 1 HR: CPT

## 2020-04-02 PROCEDURE — 96372 THER/PROPH/DIAG INJ SC/IM: CPT

## 2020-04-02 PROCEDURE — 96375 TX/PRO/DX INJ NEW DRUG ADDON: CPT

## 2020-04-02 RX ORDER — DIPHENHYDRAMINE HCL 25 MG
25 CAPSULE ORAL ONCE
Status: COMPLETED | OUTPATIENT
Start: 2020-04-02 | End: 2020-04-02

## 2020-04-02 RX ORDER — SODIUM CHLORIDE 0.9 % (FLUSH) 0.9 %
10 SYRINGE (ML) INJECTION ONCE
Status: CANCELLED | OUTPATIENT
Start: 2020-04-02

## 2020-04-02 RX ORDER — ACETAMINOPHEN 325 MG/1
650 TABLET ORAL ONCE
Status: COMPLETED | OUTPATIENT
Start: 2020-04-02 | End: 2020-04-02

## 2020-04-02 RX ORDER — SODIUM CHLORIDE 0.9 % (FLUSH) 0.9 %
10 SYRINGE (ML) INJECTION ONCE
Status: COMPLETED | OUTPATIENT
Start: 2020-04-02 | End: 2020-04-02

## 2020-04-02 RX ORDER — DIPHENHYDRAMINE HCL 25 MG
25 CAPSULE ORAL ONCE
Status: CANCELLED | OUTPATIENT
Start: 2020-04-02

## 2020-04-02 RX ORDER — ACETAMINOPHEN 325 MG/1
650 TABLET ORAL ONCE
Status: CANCELLED | OUTPATIENT
Start: 2020-04-02

## 2020-04-02 RX ADMIN — ACETAMINOPHEN 650 MG: 325 TABLET ORAL at 11:38:00

## 2020-04-02 RX ADMIN — SODIUM CHLORIDE 0.9 % (FLUSH) 10 ML: 0.9 % SYRINGE (ML) INJECTION at 16:45:00

## 2020-04-02 RX ADMIN — DIPHENHYDRAMINE HCL 25 MG: 25 MG CAPSULE ORAL at 11:29:00

## 2020-04-02 NOTE — PROGRESS NOTES
Cancer Center Report of Consultation    Patient Name: Narciso Nguyen   YOB: 1955   Medical Record Number: [id number]   CSN: [id number]   Consulting Physician: Carl Morataya MD  Referring Physician(s): No ref.  provider found  Date of Consu History:  Family History   Problem Relation Age of Onset   • Cancer Father 71        leukemia       Gyne History:  OB History    Comment: Menarche at age 8  Menopause at age 48  First full term pregnancy at age 41    Psychosocial History:  Social History Not on file      Allergies:   No Known Allergies    Current Medications:    Current Outpatient Medications:   •  dexamethasone (DECADRON) 4 MG tablet, Take 2 tablets (8 mg total) by mouth 2 (two) times daily with meals. , Disp: 24 tablet, Rfl: 4  •  aceta 1005)  Temp: 99.6 °F (37.6 °C) (04/02 1005)  Do Not Use - Resp Rate: --  SpO2: 99 % (04/02 1005)    Physical Examination:    Constitutional: Patient is alert and oriented x 3, not in acute distress. HEENT:  Oropharynx is clear. Neck is supple.   Eyes: Anic None.  OTHER:  Negative. CONCLUSION:  No evidence of metastatic disease. CT of the CAP on 3/24/2020:  FINDINGS:       CHEST:    LUNGS: Mild bilateral subsegmental atelectasis. MEDIASTINUM:  No enlarged adenopathy.     NOVA:  No enlarged adenopathy patient's previous mammogram/ultrasound were reviewed. The procedure, its alternatives, and potential risks and complications were discussed with the patient prior to the biopsy procedure. Witnessed verbal and written informed consent was obtained.  A time- 60%  Her2 3+    The patient had elevated LFT's and Alk Phos. She had a metastatic work up with CT scan and bone scan that had no evidence of distant metastatic disease. Her repeat labs today have normal AST/ALT.  She will proceed with neoadjuvant therapy wi

## 2020-04-02 NOTE — PROGRESS NOTES
Pt here for C1D1.   Arrives Ambulating independently, accompanied by Self           Patient reports possible pregnancy since last therapy cycle: Not Applicable    Modifications in dose or schedule: No     Frequency of blood return and site check throughout

## 2020-04-02 NOTE — PATIENT INSTRUCTIONS
1. You may take zofran tonight at 7:30pm.  2. You may take compazine as soon as you get home and then again prior to bedtime if you would like. 3. Take zofran every 8 hours for the next 2-3 days                For example: 6am, 2pm, 10pm  4.  You may take hours prior to the dose delivery time. 8. Remove the injector device at the directed time above and place in a hard  container for disposal and place in trash.

## 2020-04-02 NOTE — PROGRESS NOTES
PIPO met with patient. Patient reports that she works in [employer] at [employer] and is currently working from home. She reports that she lives with her spouse and has two adult children at home currently helping out.  Patient reports that she is trying t

## 2020-04-03 ENCOUNTER — TELEPHONE (OUTPATIENT)
Dept: HEMATOLOGY/ONCOLOGY | Facility: HOSPITAL | Age: 65
End: 2020-04-03

## 2020-04-03 NOTE — TELEPHONE ENCOUNTER
Toxicities: C1 D1 Docetaxel/Carboplatin/Trastuzumab/Pertuzumab on 4/2/2020    Nausea/Myalgias & Arthralgias     Nausea: Grade 1 (Pt feels slightly nauseated this morning.  No vomiting.)  Myalgias & Arthralgias: Grade 1 (Pt reports her muscles in her back ac

## 2020-04-06 ENCOUNTER — VIRTUAL PHONE E/M (OUTPATIENT)
Dept: SURGERY | Facility: CLINIC | Age: 65
End: 2020-04-06

## 2020-04-06 DIAGNOSIS — C50.811 MALIGNANT NEOPLASM OF OVERLAPPING SITES OF RIGHT BREAST IN FEMALE, ESTROGEN RECEPTOR POSITIVE (HCC): Primary | ICD-10-CM

## 2020-04-06 DIAGNOSIS — Z17.0 MALIGNANT NEOPLASM OF OVERLAPPING SITES OF RIGHT BREAST IN FEMALE, ESTROGEN RECEPTOR POSITIVE (HCC): Primary | ICD-10-CM

## 2020-04-06 NOTE — PROGRESS NOTES
Virtual/Telephone Check-In    Masha Graves verbally consents to a Virtual/Telephone Check-In service on 04/06/20. I contacted patient via telephone regarding her recent portacatheter placement performed by Dr. Rhett Weiss on March 25, 2020.     The pat

## 2020-04-10 NOTE — H&P
New Patient Visit Note       Active Problems      1. Malignant neoplasm of right female breast, unspecified estrogen receptor status, unspecified site of breast ([facility]    2.  Breast cancer metastasized to axillary lymph node, right [facility])        Chief Complai mg total) by mouth 2 (two) times daily with meals. , Disp: 24 tablet, Rfl: 4  •  acetaminophen 500 MG Oral Tab, Take 1,000 mg by mouth every 6 (six) hours as needed for Pain., Disp: , Rfl:       Review of Systems  The Review of Systems has been reviewed by region  No abnormalities left breast     [facility] imaging-bilateral breast MRI reveals 2.3 cm retroareolar area of enhancement at the 10 o'clock position, 4 cm area of enhancement in the upper outer quadrant 10 o'clock position.     These areas are suspiciou described in detail. The role of multidisciplinary care conference was discussed. Surgical options including lumpectomy versus mastectomy with or without reconstruction was also discussed.   [place] lymph node biopsy, possible axillary dissection was al

## 2020-04-23 ENCOUNTER — OFFICE VISIT (OUTPATIENT)
Dept: HEMATOLOGY/ONCOLOGY | Facility: HOSPITAL | Age: 65
End: 2020-04-23
Attending: INTERNAL MEDICINE
Payer: COMMERCIAL

## 2020-04-23 VITALS
BODY MASS INDEX: 21.28 KG/M2 | RESPIRATION RATE: 18 BRPM | SYSTOLIC BLOOD PRESSURE: 155 MMHG | TEMPERATURE: 99 F | HEART RATE: 95 BPM | HEIGHT: 57.72 IN | WEIGHT: 101.38 LBS | OXYGEN SATURATION: 98 % | DIASTOLIC BLOOD PRESSURE: 92 MMHG

## 2020-04-23 DIAGNOSIS — Z17.0 MALIGNANT NEOPLASM OF OVERLAPPING SITES OF RIGHT BREAST IN FEMALE, ESTROGEN RECEPTOR POSITIVE (HCC): Primary | ICD-10-CM

## 2020-04-23 DIAGNOSIS — C77.3 BREAST CANCER METASTASIZED TO AXILLARY LYMPH NODE, RIGHT (HCC): ICD-10-CM

## 2020-04-23 DIAGNOSIS — C50.811 MALIGNANT NEOPLASM OF OVERLAPPING SITES OF RIGHT BREAST IN FEMALE, ESTROGEN RECEPTOR POSITIVE (HCC): Primary | ICD-10-CM

## 2020-04-23 DIAGNOSIS — D63.0 ANEMIA COMPLICATING NEOPLASTIC DISEASE: ICD-10-CM

## 2020-04-23 DIAGNOSIS — C50.911 BREAST CANCER METASTASIZED TO AXILLARY LYMPH NODE, RIGHT (HCC): ICD-10-CM

## 2020-04-23 PROCEDURE — 96375 TX/PRO/DX INJ NEW DRUG ADDON: CPT

## 2020-04-23 PROCEDURE — 96417 CHEMO IV INFUS EACH ADDL SEQ: CPT

## 2020-04-23 PROCEDURE — 85025 COMPLETE CBC W/AUTO DIFF WBC: CPT

## 2020-04-23 PROCEDURE — 99214 OFFICE O/P EST MOD 30 MIN: CPT | Performed by: INTERNAL MEDICINE

## 2020-04-23 PROCEDURE — 80053 COMPREHEN METABOLIC PANEL: CPT

## 2020-04-23 PROCEDURE — 96413 CHEMO IV INFUSION 1 HR: CPT

## 2020-04-23 PROCEDURE — 96377 APPLICATON ON-BODY INJECTOR: CPT

## 2020-04-23 RX ORDER — SODIUM CHLORIDE 0.9 % (FLUSH) 0.9 %
10 SYRINGE (ML) INJECTION ONCE
Status: CANCELLED | OUTPATIENT
Start: 2020-04-23

## 2020-04-23 RX ORDER — SODIUM CHLORIDE 0.9 % (FLUSH) 0.9 %
10 SYRINGE (ML) INJECTION ONCE
Status: COMPLETED | OUTPATIENT
Start: 2020-04-23 | End: 2020-04-23

## 2020-04-23 RX ADMIN — SODIUM CHLORIDE 0.9 % (FLUSH) 10 ML: 0.9 % SYRINGE (ML) INJECTION at 15:35:00

## 2020-04-23 NOTE — PROGRESS NOTES
Cancer Center Progress Note    Problem List:      Patient Active Problem List:     Malignant neoplasm of overlapping sites of right breast in female, estrogen receptor positive ([facility]     Breast cancer metastasized to axillary lymph node, right ([facility]     Br Diagnosis Date   • Cancer of overlapping sites of right female breast [facility])     rt breast       Past Surgical History:   Procedure Laterality Date   • CATHETER INSERTION PORT-A-CATH Left 3/25/2020    Performed by Linda To MD at [facility] MAIN OR   • Ny Lewis WBC 15.2 (H) 04/23/2020    RBC 3.90 04/23/2020    HGB 11.4 (L) 04/23/2020    HCT 34.7 (L) 04/23/2020    MCV 89.0 04/23/2020    MCH 29.2 04/23/2020    MCHC 32.9 04/23/2020    RDW 14.6 04/23/2020    .0 04/23/2020     Lab Results   Component Value D

## 2020-04-23 NOTE — PROGRESS NOTES
Pt here for C2D1 TCHP.   Arrives Ambulating independently, accompanied by Self           Patient reports possible pregnancy since last therapy cycle: No    Modifications in dose or schedule: No     Frequency of blood return and site check throughout adminis

## 2020-04-23 NOTE — PATIENT INSTRUCTIONS
On-body Injector for Neulasta Patient Instructions       Your On-Body Injection device was applied on this day:  April 23, 2020 at this time 3:30 PM    Your dose of medication will start on this day: April 24, 20

## 2020-05-15 ENCOUNTER — OFFICE VISIT (OUTPATIENT)
Dept: HEMATOLOGY/ONCOLOGY | Facility: HOSPITAL | Age: 65
End: 2020-05-15
Attending: INTERNAL MEDICINE
Payer: COMMERCIAL

## 2020-05-15 VITALS
BODY MASS INDEX: 20.57 KG/M2 | WEIGHT: 98 LBS | SYSTOLIC BLOOD PRESSURE: 165 MMHG | RESPIRATION RATE: 16 BRPM | DIASTOLIC BLOOD PRESSURE: 102 MMHG | HEIGHT: 57.72 IN | TEMPERATURE: 99 F | HEART RATE: 90 BPM | OXYGEN SATURATION: 93 %

## 2020-05-15 DIAGNOSIS — Z17.0 MALIGNANT NEOPLASM OF OVERLAPPING SITES OF RIGHT BREAST IN FEMALE, ESTROGEN RECEPTOR POSITIVE (HCC): Primary | ICD-10-CM

## 2020-05-15 DIAGNOSIS — C50.811 MALIGNANT NEOPLASM OF OVERLAPPING SITES OF RIGHT BREAST IN FEMALE, ESTROGEN RECEPTOR POSITIVE (HCC): Primary | ICD-10-CM

## 2020-05-15 DIAGNOSIS — C77.3 BREAST CANCER METASTASIZED TO AXILLARY LYMPH NODE, RIGHT (HCC): ICD-10-CM

## 2020-05-15 DIAGNOSIS — C50.911 BREAST CANCER METASTASIZED TO AXILLARY LYMPH NODE, RIGHT (HCC): ICD-10-CM

## 2020-05-15 DIAGNOSIS — L65.8 ALOPECIA DUE TO CYTOTOXIC DRUG: ICD-10-CM

## 2020-05-15 DIAGNOSIS — D63.0 ANEMIA COMPLICATING NEOPLASTIC DISEASE: ICD-10-CM

## 2020-05-15 DIAGNOSIS — T45.1X5A ALOPECIA DUE TO CYTOTOXIC DRUG: ICD-10-CM

## 2020-05-15 PROCEDURE — 80053 COMPREHEN METABOLIC PANEL: CPT

## 2020-05-15 PROCEDURE — 96377 APPLICATON ON-BODY INJECTOR: CPT

## 2020-05-15 PROCEDURE — 96375 TX/PRO/DX INJ NEW DRUG ADDON: CPT

## 2020-05-15 PROCEDURE — 96413 CHEMO IV INFUSION 1 HR: CPT

## 2020-05-15 PROCEDURE — 99214 OFFICE O/P EST MOD 30 MIN: CPT | Performed by: INTERNAL MEDICINE

## 2020-05-15 PROCEDURE — 85025 COMPLETE CBC W/AUTO DIFF WBC: CPT

## 2020-05-15 PROCEDURE — 96417 CHEMO IV INFUS EACH ADDL SEQ: CPT

## 2020-05-15 NOTE — PATIENT INSTRUCTIONS
On-body Injector for Neulasta Patient Instructions       Your On-Body Injection device was applied on this day:  5-15-20 at this time 2:40pm    Your dose of medication will start on this day: 5-16-20 at this time

## 2020-05-15 NOTE — PROGRESS NOTES
Cancer Center Progress Note    Problem List:      Patient Active Problem List:     Malignant neoplasm of overlapping sites of right breast in female, estrogen receptor positive ([facility]     Breast cancer metastasized to axillary lymph node, right ([facility]     Br History:   Procedure Laterality Date   • CATHETER INSERTION PORT-A-CATH Left 3/25/2020    Performed by Blaze Perales MD at [facility] MAIN OR   • OTHER SURGICAL HISTORY              Family History Reviewed:  Family History   Problem Relation Age of mood is calm and appropriate for this visit.       Labs reviewed at this visit:     Lab Results   Component Value Date    WBC 15.3 (H) 05/15/2020    RBC 3.57 (L) 05/15/2020    HGB 10.7 (L) 05/15/2020    HCT 32.7 (L) 05/15/2020    MCV 91.6 05/15/2020    MCH folliculitis.      Nan Albarran MD

## 2020-05-17 PROBLEM — D63.0 ANEMIA COMPLICATING NEOPLASTIC DISEASE: Status: ACTIVE | Noted: 2020-05-17

## 2020-05-17 PROBLEM — L65.8 ALOPECIA DUE TO CYTOTOXIC DRUG: Status: ACTIVE | Noted: 2020-05-17

## 2020-05-17 PROBLEM — T45.1X5A ALOPECIA DUE TO CYTOTOXIC DRUG: Status: ACTIVE | Noted: 2020-05-17

## 2020-06-05 ENCOUNTER — OFFICE VISIT (OUTPATIENT)
Dept: HEMATOLOGY/ONCOLOGY | Facility: HOSPITAL | Age: 65
End: 2020-06-05
Attending: INTERNAL MEDICINE
Payer: COMMERCIAL

## 2020-06-05 VITALS
SYSTOLIC BLOOD PRESSURE: 150 MMHG | TEMPERATURE: 99 F | DIASTOLIC BLOOD PRESSURE: 87 MMHG | WEIGHT: 99.81 LBS | HEIGHT: 57.72 IN | BODY MASS INDEX: 20.95 KG/M2 | HEART RATE: 100 BPM | OXYGEN SATURATION: 97 % | RESPIRATION RATE: 18 BRPM

## 2020-06-05 DIAGNOSIS — C77.3 BREAST CANCER METASTASIZED TO AXILLARY LYMPH NODE, RIGHT (HCC): ICD-10-CM

## 2020-06-05 DIAGNOSIS — D63.0 ANEMIA COMPLICATING NEOPLASTIC DISEASE: ICD-10-CM

## 2020-06-05 DIAGNOSIS — C50.811 MALIGNANT NEOPLASM OF OVERLAPPING SITES OF RIGHT BREAST IN FEMALE, ESTROGEN RECEPTOR POSITIVE (HCC): Primary | ICD-10-CM

## 2020-06-05 DIAGNOSIS — C50.911 BREAST CANCER METASTASIZED TO AXILLARY LYMPH NODE, RIGHT (HCC): ICD-10-CM

## 2020-06-05 DIAGNOSIS — Z17.0 MALIGNANT NEOPLASM OF OVERLAPPING SITES OF RIGHT BREAST IN FEMALE, ESTROGEN RECEPTOR POSITIVE (HCC): Primary | ICD-10-CM

## 2020-06-05 DIAGNOSIS — T45.1X5A ALOPECIA DUE TO CYTOTOXIC DRUG: ICD-10-CM

## 2020-06-05 DIAGNOSIS — L65.8 ALOPECIA DUE TO CYTOTOXIC DRUG: ICD-10-CM

## 2020-06-05 PROCEDURE — 99214 OFFICE O/P EST MOD 30 MIN: CPT | Performed by: INTERNAL MEDICINE

## 2020-06-05 PROCEDURE — 96375 TX/PRO/DX INJ NEW DRUG ADDON: CPT

## 2020-06-05 PROCEDURE — 80053 COMPREHEN METABOLIC PANEL: CPT

## 2020-06-05 PROCEDURE — 96377 APPLICATON ON-BODY INJECTOR: CPT

## 2020-06-05 PROCEDURE — 96413 CHEMO IV INFUSION 1 HR: CPT

## 2020-06-05 PROCEDURE — 85007 BL SMEAR W/DIFF WBC COUNT: CPT

## 2020-06-05 PROCEDURE — 96417 CHEMO IV INFUS EACH ADDL SEQ: CPT

## 2020-06-05 PROCEDURE — 85027 COMPLETE CBC AUTOMATED: CPT

## 2020-06-05 PROCEDURE — 85025 COMPLETE CBC W/AUTO DIFF WBC: CPT

## 2020-06-05 NOTE — PROGRESS NOTES
Cancer Center Progress Note    Problem List:      Patient Active Problem List:     Malignant neoplasm of overlapping sites of right breast in female, estrogen receptor positive ([facility]     Breast cancer metastasized to axillary lymph node, right ([facility]     Br Left 3/25/2020    Performed by Rony You MD at [facility] MAIN OR   • OTHER SURGICAL HISTORY              Family History Reviewed:  Family History   Problem Relation Age of Onset   • Cancer Father 71        leukemia       Allergies:     No Know 91.6 05/15/2020    MCH 30.0 05/15/2020    MCHC 32.7 05/15/2020    RDW 18.0 (H) 05/15/2020    .0 05/15/2020     Lab Results   Component Value Date     05/15/2020    K 3.8 05/15/2020     05/15/2020    CO2 29.0 05/15/2020    BUN 22 (H) 05/1

## 2020-06-05 NOTE — PROGRESS NOTES
On-body Injector for Neulasta Patient Instructions       Your On-Body Injection device was applied on this day: 6/5 at this time 3:15 PM    Your dose of medication will start on this day: 6/6 at this time 6:15 PM

## 2020-06-26 ENCOUNTER — OFFICE VISIT (OUTPATIENT)
Dept: HEMATOLOGY/ONCOLOGY | Facility: HOSPITAL | Age: 65
End: 2020-06-26
Attending: INTERNAL MEDICINE
Payer: COMMERCIAL

## 2020-06-26 ENCOUNTER — NURSE NAVIGATOR ENCOUNTER (OUTPATIENT)
Dept: HEMATOLOGY/ONCOLOGY | Facility: HOSPITAL | Age: 65
End: 2020-06-26

## 2020-06-26 VITALS
TEMPERATURE: 98 F | BODY MASS INDEX: 20.44 KG/M2 | OXYGEN SATURATION: 99 % | SYSTOLIC BLOOD PRESSURE: 150 MMHG | RESPIRATION RATE: 16 BRPM | HEART RATE: 109 BPM | WEIGHT: 97.38 LBS | DIASTOLIC BLOOD PRESSURE: 66 MMHG | HEIGHT: 57.72 IN

## 2020-06-26 DIAGNOSIS — C50.911 BREAST CANCER METASTASIZED TO AXILLARY LYMPH NODE, RIGHT (HCC): ICD-10-CM

## 2020-06-26 DIAGNOSIS — D63.0 ANEMIA COMPLICATING NEOPLASTIC DISEASE: ICD-10-CM

## 2020-06-26 DIAGNOSIS — L65.8 ALOPECIA DUE TO CYTOTOXIC DRUG: ICD-10-CM

## 2020-06-26 DIAGNOSIS — C77.3 BREAST CANCER METASTASIZED TO AXILLARY LYMPH NODE, RIGHT (HCC): ICD-10-CM

## 2020-06-26 DIAGNOSIS — C50.811 MALIGNANT NEOPLASM OF OVERLAPPING SITES OF RIGHT BREAST IN FEMALE, ESTROGEN RECEPTOR POSITIVE (HCC): Primary | ICD-10-CM

## 2020-06-26 DIAGNOSIS — T45.1X5A ALOPECIA DUE TO CYTOTOXIC DRUG: ICD-10-CM

## 2020-06-26 DIAGNOSIS — Z17.0 MALIGNANT NEOPLASM OF OVERLAPPING SITES OF RIGHT BREAST IN FEMALE, ESTROGEN RECEPTOR POSITIVE (HCC): Primary | ICD-10-CM

## 2020-06-26 PROCEDURE — 96413 CHEMO IV INFUSION 1 HR: CPT

## 2020-06-26 PROCEDURE — 96377 APPLICATON ON-BODY INJECTOR: CPT

## 2020-06-26 PROCEDURE — 99214 OFFICE O/P EST MOD 30 MIN: CPT | Performed by: INTERNAL MEDICINE

## 2020-06-26 PROCEDURE — 80053 COMPREHEN METABOLIC PANEL: CPT

## 2020-06-26 PROCEDURE — 85025 COMPLETE CBC W/AUTO DIFF WBC: CPT

## 2020-06-26 PROCEDURE — 96417 CHEMO IV INFUS EACH ADDL SEQ: CPT

## 2020-06-26 PROCEDURE — 96375 TX/PRO/DX INJ NEW DRUG ADDON: CPT

## 2020-06-26 NOTE — PATIENT INSTRUCTIONS
On-body Injector for Neulasta Patient Instructions       Your On-Body Injection device was applied on this day:  6-26-20 at this time 4:00pm    Your dose of medication will start on this day: 6-27-20 at this time

## 2020-06-26 NOTE — PROGRESS NOTES
Cancer Center Progress Note    Problem List:      Patient Active Problem List:     Malignant neoplasm of overlapping sites of right breast in female, estrogen receptor positive ([facility]     Breast cancer metastasized to axillary lymph node, right ([facility]     Br PORT-A-CATH Left 3/25/2020    Performed by Christine Lozano MD at [facility] OR   • OTHER SURGICAL HISTORY              Family History Reviewed:  Family History   Problem Relation Age of Onset   • Cancer Father 71        leukemia       Allergies: ulceration. Lymphatics: There is no palpable lymphadenopathy throughout in the cervical, supraclavicular, or axillary regions. Psychiatric: The patient's mood is calm and appropriate for this visit.       Labs reviewed at this visit:     Lab Results   Com treatment. Anemia complicating neoplastic disease:    Hemoglobin is progressively decreasing. She has relatively little fatigue. Continue to follow. Chemo induced alopecia:    She has no folliculitis.      Nuzhat Myles MD

## 2020-06-26 NOTE — PROGRESS NOTES
Pt here for C5D1.   Arrives Ambulating independently, accompanied by Self           Patient reports possible pregnancy since last therapy cycle: Not Applicable    Modifications in dose or schedule: No     Frequency of blood return and site check throughout

## 2020-06-26 NOTE — PROGRESS NOTES
Spoke with patient regarding plan of care. Pt will complete chemotherapy on 7/16/ 2020. We will plan for MRI the following week and to meet with Dr. Suzi Whitten following that.  Pt would like to tentatively set up with plastic surgery for the day she meets with YANI

## 2020-07-16 ENCOUNTER — OFFICE VISIT (OUTPATIENT)
Dept: HEMATOLOGY/ONCOLOGY | Facility: HOSPITAL | Age: 65
End: 2020-07-16
Attending: INTERNAL MEDICINE
Payer: COMMERCIAL

## 2020-07-16 VITALS
OXYGEN SATURATION: 94 % | HEART RATE: 102 BPM | WEIGHT: 96.81 LBS | DIASTOLIC BLOOD PRESSURE: 84 MMHG | RESPIRATION RATE: 16 BRPM | HEIGHT: 57.72 IN | TEMPERATURE: 100 F | SYSTOLIC BLOOD PRESSURE: 143 MMHG | BODY MASS INDEX: 20.32 KG/M2

## 2020-07-16 DIAGNOSIS — C50.811 MALIGNANT NEOPLASM OF OVERLAPPING SITES OF RIGHT BREAST IN FEMALE, ESTROGEN RECEPTOR POSITIVE (HCC): Primary | ICD-10-CM

## 2020-07-16 DIAGNOSIS — L65.8 ALOPECIA DUE TO CYTOTOXIC DRUG: ICD-10-CM

## 2020-07-16 DIAGNOSIS — T45.1X5A ALOPECIA DUE TO CYTOTOXIC DRUG: ICD-10-CM

## 2020-07-16 DIAGNOSIS — C77.3 BREAST CANCER METASTASIZED TO AXILLARY LYMPH NODE, RIGHT (HCC): ICD-10-CM

## 2020-07-16 DIAGNOSIS — Z17.0 MALIGNANT NEOPLASM OF OVERLAPPING SITES OF RIGHT BREAST IN FEMALE, ESTROGEN RECEPTOR POSITIVE (HCC): Primary | ICD-10-CM

## 2020-07-16 DIAGNOSIS — D63.0 ANEMIA COMPLICATING NEOPLASTIC DISEASE: ICD-10-CM

## 2020-07-16 DIAGNOSIS — C50.911 BREAST CANCER METASTASIZED TO AXILLARY LYMPH NODE, RIGHT (HCC): ICD-10-CM

## 2020-07-16 LAB
ALBUMIN SERPL-MCNC: 4 G/DL (ref 3.4–5)
ALBUMIN/GLOB SERPL: 1.4 {RATIO} (ref 1–2)
ALP LIVER SERPL-CCNC: 93 U/L (ref 50–130)
ALT SERPL-CCNC: 25 U/L (ref 13–56)
ANION GAP SERPL CALC-SCNC: 4 MMOL/L (ref 0–18)
AST SERPL-CCNC: 15 U/L (ref 15–37)
BASOPHILS # BLD AUTO: 0.01 X10(3) UL (ref 0–0.2)
BASOPHILS NFR BLD AUTO: 0.1 %
BILIRUB SERPL-MCNC: 0.4 MG/DL (ref 0.1–2)
BUN BLD-MCNC: 16 MG/DL (ref 7–18)
BUN/CREAT SERPL: 16.7 (ref 10–20)
CALCIUM BLD-MCNC: 9.5 MG/DL (ref 8.5–10.1)
CHLORIDE SERPL-SCNC: 107 MMOL/L (ref 98–112)
CO2 SERPL-SCNC: 28 MMOL/L (ref 21–32)
CREAT BLD-MCNC: 0.96 MG/DL (ref 0.55–1.02)
DEPRECATED RDW RBC AUTO: 76.1 FL (ref 35.1–46.3)
EOSINOPHIL # BLD AUTO: 0 X10(3) UL (ref 0–0.7)
EOSINOPHIL NFR BLD AUTO: 0 %
ERYTHROCYTE [DISTWIDTH] IN BLOOD BY AUTOMATED COUNT: 19.2 % (ref 11–15)
GLOBULIN PLAS-MCNC: 2.9 G/DL (ref 2.8–4.4)
GLUCOSE BLD-MCNC: 123 MG/DL (ref 70–99)
HCT VFR BLD AUTO: 28.1 % (ref 35–48)
HGB BLD-MCNC: 8.9 G/DL (ref 12–16)
IMM GRANULOCYTES # BLD AUTO: 0.31 X10(3) UL (ref 0–1)
IMM GRANULOCYTES NFR BLD: 2.4 %
LYMPHOCYTES # BLD AUTO: 0.52 X10(3) UL (ref 1–4)
LYMPHOCYTES NFR BLD AUTO: 4 %
M PROTEIN MFR SERPL ELPH: 6.9 G/DL (ref 6.4–8.2)
MCH RBC QN AUTO: 33.8 PG (ref 26–34)
MCHC RBC AUTO-ENTMCNC: 31.7 G/DL (ref 31–37)
MCV RBC AUTO: 106.8 FL (ref 80–100)
MONOCYTES # BLD AUTO: 0.17 X10(3) UL (ref 0.1–1)
MONOCYTES NFR BLD AUTO: 1.3 %
NEUTROPHILS # BLD AUTO: 12.04 X10 (3) UL (ref 1.5–7.7)
NEUTROPHILS # BLD AUTO: 12.04 X10(3) UL (ref 1.5–7.7)
NEUTROPHILS NFR BLD AUTO: 92.2 %
OSMOLALITY SERPL CALC.SUM OF ELEC: 291 MOSM/KG (ref 275–295)
PATIENT FASTING Y/N/NP: NO
PLATELET # BLD AUTO: 167 10(3)UL (ref 150–450)
PLATELET MORPHOLOGY: NORMAL
POTASSIUM SERPL-SCNC: 3.8 MMOL/L (ref 3.5–5.1)
RBC # BLD AUTO: 2.63 X10(6)UL (ref 3.8–5.3)
SODIUM SERPL-SCNC: 139 MMOL/L (ref 136–145)
WBC # BLD AUTO: 13.1 X10(3) UL (ref 4–11)

## 2020-07-16 PROCEDURE — 96417 CHEMO IV INFUS EACH ADDL SEQ: CPT

## 2020-07-16 PROCEDURE — 96413 CHEMO IV INFUSION 1 HR: CPT

## 2020-07-16 PROCEDURE — 85025 COMPLETE CBC W/AUTO DIFF WBC: CPT

## 2020-07-16 PROCEDURE — 99214 OFFICE O/P EST MOD 30 MIN: CPT | Performed by: INTERNAL MEDICINE

## 2020-07-16 PROCEDURE — 96375 TX/PRO/DX INJ NEW DRUG ADDON: CPT

## 2020-07-16 PROCEDURE — 80053 COMPREHEN METABOLIC PANEL: CPT

## 2020-07-16 PROCEDURE — 96377 APPLICATON ON-BODY INJECTOR: CPT

## 2020-07-16 NOTE — PROGRESS NOTES
Cancer Center Progress Note    Problem List:      Patient Active Problem List:     Malignant neoplasm of overlapping sites of right breast in female, estrogen receptor positive ([facility]     Breast cancer metastasized to axillary lymph node, right ([facility]     Br HISTORY  2001            Family History Reviewed:  Family History   Problem Relation Age of Onset   • Cancer Father 71        leukemia       Allergies:     No Known Allergies    Medications:  Prochlorperazine Maleate (COMPAZINE) 10 mg tablet, Take ulceration. Lymphatics: There is no palpable lymphadenopathy throughout in the cervical, supraclavicular, or axillary regions. Psychiatric: The patient's mood is calm and appropriate for this visit.       Labs reviewed at this visit:     Lab Results   Com after this cycle. She will get the HP infusion in three weeks. Anemia complicating neoplastic disease:    Hemoglobin is progressively decreasing. She has relatively little fatigue. Continue to follow.     Chemo induced alopecia:    She has no folliculiti

## 2020-07-16 NOTE — PROGRESS NOTES
Pt here for C6D1 TCHP.   Arrives Ambulating independently, accompanied by Self           Patient reports possible pregnancy since last therapy cycle: No    Modifications in dose or schedule: No     Frequency of blood return and site check throughout adminis

## 2020-07-16 NOTE — PATIENT INSTRUCTIONS
On-body Injector for Neulasta Patient Instructions       Your On-Body Injection device was applied on this day:  July 16, 2020 at this time 3 PM    Your dose of medication will start on this day: July 17, 2020 at detailed exam

## 2020-07-23 ENCOUNTER — HOSPITAL ENCOUNTER (OUTPATIENT)
Dept: MRI IMAGING | Facility: HOSPITAL | Age: 65
Discharge: HOME | End: 2020-07-23
Attending: SURGERY
Payer: COMMERCIAL

## 2020-07-23 DIAGNOSIS — C50.811 MALIGNANT NEOPLASM OF OVERLAPPING SITES OF RIGHT BREAST IN FEMALE, ESTROGEN RECEPTOR POSITIVE (HCC): ICD-10-CM

## 2020-07-23 DIAGNOSIS — Z17.0 MALIGNANT NEOPLASM OF OVERLAPPING SITES OF RIGHT BREAST IN FEMALE, ESTROGEN RECEPTOR POSITIVE (HCC): ICD-10-CM

## 2020-07-23 PROCEDURE — 77049 MRI BREAST C-+ W/CAD BI: CPT | Performed by: SURGERY

## 2020-07-23 PROCEDURE — A9575 INJ GADOTERATE MEGLUMI 0.1ML: HCPCS | Performed by: SURGERY

## 2020-07-28 ENCOUNTER — OFFICE VISIT (OUTPATIENT)
Dept: HEMATOLOGY/ONCOLOGY | Facility: HOSPITAL | Age: 65
End: 2020-07-28
Attending: INTERNAL MEDICINE
Payer: COMMERCIAL

## 2020-07-28 ENCOUNTER — NURSE NAVIGATOR ENCOUNTER (OUTPATIENT)
Dept: HEMATOLOGY/ONCOLOGY | Facility: HOSPITAL | Age: 65
End: 2020-07-28

## 2020-07-28 ENCOUNTER — RESEARCH ENCOUNTER (OUTPATIENT)
Dept: HEMATOLOGY/ONCOLOGY | Facility: HOSPITAL | Age: 65
End: 2020-07-28

## 2020-07-28 ENCOUNTER — GENETICS ENCOUNTER (OUTPATIENT)
Dept: GENETICS | Facility: HOSPITAL | Age: 65
End: 2020-07-28
Attending: INTERNAL MEDICINE
Payer: COMMERCIAL

## 2020-07-28 ENCOUNTER — OFFICE VISIT (OUTPATIENT)
Dept: SURGERY | Facility: CLINIC | Age: 65
End: 2020-07-28
Payer: COMMERCIAL

## 2020-07-28 VITALS
BODY MASS INDEX: 19.73 KG/M2 | WEIGHT: 94 LBS | HEIGHT: 57.72 IN | SYSTOLIC BLOOD PRESSURE: 163 MMHG | RESPIRATION RATE: 16 BRPM | OXYGEN SATURATION: 98 % | HEART RATE: 112 BPM | DIASTOLIC BLOOD PRESSURE: 92 MMHG | TEMPERATURE: 98 F

## 2020-07-28 DIAGNOSIS — C50.811 MALIGNANT NEOPLASM OF OVERLAPPING SITES OF RIGHT BREAST IN FEMALE, ESTROGEN RECEPTOR POSITIVE (HCC): Primary | ICD-10-CM

## 2020-07-28 DIAGNOSIS — Z17.0 MALIGNANT NEOPLASM OF OVERLAPPING SITES OF RIGHT BREAST IN FEMALE, ESTROGEN RECEPTOR POSITIVE (HCC): Primary | ICD-10-CM

## 2020-07-28 DIAGNOSIS — C77.3 BREAST CANCER METASTASIZED TO AXILLARY LYMPH NODE, RIGHT (HCC): ICD-10-CM

## 2020-07-28 DIAGNOSIS — C50.911 BREAST CANCER METASTASIZED TO AXILLARY LYMPH NODE, RIGHT (HCC): ICD-10-CM

## 2020-07-28 PROCEDURE — 96040 HC GENETIC COUNSELING EA 30 MIN: CPT | Performed by: GENETIC COUNSELOR, MS

## 2020-07-28 PROCEDURE — 99214 OFFICE O/P EST MOD 30 MIN: CPT | Performed by: INTERNAL MEDICINE

## 2020-07-28 PROCEDURE — 99214 OFFICE O/P EST MOD 30 MIN: CPT | Performed by: SURGERY

## 2020-07-28 NOTE — PROGRESS NOTES
Referring Provider:  Luis Terrazas MD    Additional Provider(s):  Catherine Bang MD    Reason for Referral:  Lavonne Paz was referred for genetic counseling because of a personal diagnosis of cancer.   Ms. 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   Signs of a hereditary cancer syndrome include some rare cancers, common cancers occurring at unusually young ages, multiple primary cancers in the some individual, or the same type of cancer or related cancers (e. variant in a cancer predisposition gene, she is at significantly increased risk for various cancers. The magnitude of these risks, and the cancers for which she is at increased risk would depend on the gene involved.  Medical recommendations for individuals physicians. All medical management decisions should be made with a physician. Summary and Plan:  Ms. Juanito Gates was referred for genetic counseling because of a personal diagnosis of breast cancer.   Her reported family history is not highly suspicio of genetic testing is more than $0, [facility] will contact Ms. Marge Restrepo directly so she can decide if she wishes to proceed with testing. I anticipate that Ms. Strong's results will be available within 2-3 weeks and will call her with the result

## 2020-07-28 NOTE — PROGRESS NOTES
Met with patient following appointment with Dr. Kenia Garcia. Pt was offered clinical trial participation, met with research nurse. She is meeting with genetic counselor today.  Pt would like to meet with plastic surgeon (attemped to set up this appointment Aires Macedo

## 2020-07-28 NOTE — PROGRESS NOTES
Cancer Center Progress Note    Problem List:      Patient Active Problem List:     Malignant neoplasm of overlapping sites of right breast in female, estrogen receptor positive ([facility] [address]     Breast cancer metastasized to axillary lymph node, right ([facility] [address]     Br PORT-A-CATH Left 3/25/2020    Performed by Mar Lin MD at [facility] MAIN OR   • OTHER SURGICAL HISTORY              Family History Reviewed:  Family History   Problem Relation Age of Onset   • Cancer Father 71        leukemia       Allergies: breast erythema on the right breast  She has positive yris involvement in the right axilla  Biopsy on 3/11/2020 shows Invasive ductal carcinoma with micropapillary features  Grade III  %  LA negative  Ki-67 60%  Her2 3+  TCHP cycles 1 on 4/2/2020, C

## 2020-07-28 NOTE — PROGRESS NOTES
Patient is here today for follow up  with Dr. Shala Watson and Dr. Colby Unger for  Keyla Narayanan. Patient Denies pain. Medication list and medical history were reviewed and updated.     Education Record    Learner:  Patient    Disease / Diagnosis: Breast Clinic    [facility]

## 2020-07-28 NOTE — PROGRESS NOTES
Patient is here today for follow up  with Dr. Emmy Ramon and Dr. Perla Marques for  [address]. Patient Denies pain. Medication list and medical history were reviewed and updated.     Education Record    Learner:  Patient    Disease / Diagnosis: Breast Clinic    [facility]

## 2020-07-28 NOTE — PROGRESS NOTES
Follow Up Visit Note       Active Problems      1. Malignant neoplasm of overlapping sites of right breast in female, estrogen receptor positive ([facility] [address]    2.  Breast cancer metastasized to axillary lymph node, right [facility])          Chief Complaint   No chief discharge, redness and visual disturbance. Respiratory: Negative for cough, shortness of breath and wheezing. Cardiovascular: Negative for chest pain and palpitations.    Gastrointestinal: Negative for abdominal distention, abdominal pain, blood in sto tenderness elicited. Left Breast-no dominant masses are noted   there is no abnormal dimpling or change in contour. There is no skin erythema, edema or peau d'orange. There is no nipple inversion and no discharge noted spontaneous or elicited.   No focal options  Sheila is also a candidate for the alliance trial to be randomized to axillary dissection versus axillary RT for positive yris disease  Sheila did speak with research RN, Lakshmi this morning.   She did receive information and will finalize her decis

## 2020-07-28 NOTE — PROGRESS NOTES
A RANDOMIZED PHASE III TRIAL COMPARING AXILLARY LYMPH NODE DISSECTION TO AXILLARY RADIATION IN BREAST CANCER PATIENTS (cT1-3 N1) WHO HAVE POSITIVE SENTINEL LYMPH NODE DISEASE AFTER NEOADJUVANT CHEMOTHERAPY     Above protocol initially presented by

## 2020-07-30 ENCOUNTER — RESEARCH ENCOUNTER (OUTPATIENT)
Dept: HEMATOLOGY/ONCOLOGY | Facility: HOSPITAL | Age: 65
End: 2020-07-30

## 2020-07-30 NOTE — PROGRESS NOTES
Called patient and left voice message requesting call back regarding participation in clinical trail.   Will await patient response

## 2020-07-31 ENCOUNTER — HOSPITAL ENCOUNTER (OUTPATIENT)
Dept: CV DIAGNOSTICS | Facility: HOSPITAL | Age: 65
Discharge: HOME | End: 2020-07-31
Attending: INTERNAL MEDICINE
Payer: COMMERCIAL

## 2020-07-31 DIAGNOSIS — C50.811 MALIGNANT NEOPLASM OF OVERLAPPING SITES OF RIGHT BREAST IN FEMALE, ESTROGEN RECEPTOR POSITIVE (HCC): ICD-10-CM

## 2020-07-31 DIAGNOSIS — C50.911 BREAST CANCER METASTASIZED TO AXILLARY LYMPH NODE, RIGHT (HCC): ICD-10-CM

## 2020-07-31 DIAGNOSIS — Z17.0 MALIGNANT NEOPLASM OF OVERLAPPING SITES OF RIGHT BREAST IN FEMALE, ESTROGEN RECEPTOR POSITIVE (HCC): ICD-10-CM

## 2020-07-31 DIAGNOSIS — C77.3 BREAST CANCER METASTASIZED TO AXILLARY LYMPH NODE, RIGHT (HCC): ICD-10-CM

## 2020-07-31 PROCEDURE — 93306 TTE W/DOPPLER COMPLETE: CPT | Performed by: INTERNAL MEDICINE

## 2020-08-06 ENCOUNTER — OFFICE VISIT (OUTPATIENT)
Dept: HEMATOLOGY/ONCOLOGY | Facility: HOSPITAL | Age: 65
End: 2020-08-06
Attending: INTERNAL MEDICINE
Payer: COMMERCIAL

## 2020-08-06 ENCOUNTER — RESEARCH ENCOUNTER (OUTPATIENT)
Dept: HEMATOLOGY/ONCOLOGY | Facility: HOSPITAL | Age: 65
End: 2020-08-06

## 2020-08-06 VITALS
TEMPERATURE: 99 F | WEIGHT: 94.63 LBS | RESPIRATION RATE: 16 BRPM | OXYGEN SATURATION: 96 % | HEIGHT: 57.72 IN | BODY MASS INDEX: 19.86 KG/M2 | HEART RATE: 93 BPM | SYSTOLIC BLOOD PRESSURE: 121 MMHG | DIASTOLIC BLOOD PRESSURE: 81 MMHG

## 2020-08-06 DIAGNOSIS — C50.911 BREAST CANCER METASTASIZED TO AXILLARY LYMPH NODE, RIGHT (HCC): ICD-10-CM

## 2020-08-06 DIAGNOSIS — C77.3 BREAST CANCER METASTASIZED TO AXILLARY LYMPH NODE, RIGHT (HCC): ICD-10-CM

## 2020-08-06 DIAGNOSIS — C50.811 MALIGNANT NEOPLASM OF OVERLAPPING SITES OF RIGHT BREAST IN FEMALE, ESTROGEN RECEPTOR POSITIVE (HCC): Primary | ICD-10-CM

## 2020-08-06 DIAGNOSIS — Z17.0 MALIGNANT NEOPLASM OF OVERLAPPING SITES OF RIGHT BREAST IN FEMALE, ESTROGEN RECEPTOR POSITIVE (HCC): Primary | ICD-10-CM

## 2020-08-06 PROCEDURE — 96417 CHEMO IV INFUS EACH ADDL SEQ: CPT

## 2020-08-06 PROCEDURE — 96413 CHEMO IV INFUSION 1 HR: CPT

## 2020-08-06 RX ORDER — SODIUM CHLORIDE 0.9 % (FLUSH) 0.9 %
10 SYRINGE (ML) INJECTION ONCE
Status: COMPLETED | OUTPATIENT
Start: 2020-08-06 | End: 2020-08-06

## 2020-08-06 RX ORDER — SODIUM CHLORIDE 0.9 % (FLUSH) 0.9 %
10 SYRINGE (ML) INJECTION ONCE
Status: CANCELLED | OUTPATIENT
Start: 2020-08-06

## 2020-08-06 RX ADMIN — SODIUM CHLORIDE 0.9 % (FLUSH) 10 ML: 0.9 % SYRINGE (ML) INJECTION at 15:48:00

## 2020-08-06 NOTE — PROGRESS NOTES
Pt here for C7D1.   Arrives Ambulating independently, accompanied by Self           Patient reports possible pregnancy since last therapy cycle: Not Applicable    Modifications in dose or schedule: No     Frequency of blood return and site check throughout

## 2020-08-06 NOTE — PROGRESS NOTES
POTENTIAL STUDY CANDIDATE    Met with patient to discuss her decision on [id number], [place] trial. States she took consent home and discussed with her . She has declined participation, stating \"it's not for me\", prefers to follow standard of care.

## 2020-08-11 ENCOUNTER — OFFICE VISIT (OUTPATIENT)
Dept: SURGERY | Facility: CLINIC | Age: 65
End: 2020-08-11
Payer: COMMERCIAL

## 2020-08-11 VITALS
OXYGEN SATURATION: 95 % | DIASTOLIC BLOOD PRESSURE: 105 MMHG | SYSTOLIC BLOOD PRESSURE: 145 MMHG | HEART RATE: 101 BPM | RESPIRATION RATE: 16 BRPM | WEIGHT: 96 LBS | HEIGHT: 59 IN | BODY MASS INDEX: 19.35 KG/M2

## 2020-08-11 DIAGNOSIS — C50.811 MALIGNANT NEOPLASM OF OVERLAPPING SITES OF RIGHT BREAST IN FEMALE, ESTROGEN RECEPTOR POSITIVE (HCC): Primary | ICD-10-CM

## 2020-08-11 DIAGNOSIS — Z17.0 MALIGNANT NEOPLASM OF OVERLAPPING SITES OF RIGHT BREAST IN FEMALE, ESTROGEN RECEPTOR POSITIVE (HCC): Primary | ICD-10-CM

## 2020-08-11 PROCEDURE — 3077F SYST BP >= 140 MM HG: CPT | Performed by: SURGERY

## 2020-08-11 PROCEDURE — 3008F BODY MASS INDEX DOCD: CPT | Performed by: SURGERY

## 2020-08-11 PROCEDURE — 99243 OFF/OP CNSLTJ NEW/EST LOW 30: CPT | Performed by: SURGERY

## 2020-08-11 PROCEDURE — 3080F DIAST BP >= 90 MM HG: CPT | Performed by: SURGERY

## 2020-08-11 NOTE — CONSULTS
New Patient Consultation    This is the first visit for this 63year old female who presents to discuss reconstructive options following surgery for breast cancer. History of Present Illness:    The patient is a 63year old female who presents with a rig weight gain. Endocrine: There is no history of poor/slow wound healing, weight loss/gain, fertility or hormone problems, cold intolerance, heat intolerance, excessive thirst, or thyroid disease.       Allergic/Immunologic:  There is no history of hives, speech problems, coordination problems, trembling/tremors, fainting/black outs, dizziness, memory problems, loss of sensation/numbness, problems walking, weakness, tingling or burning in hands/feet.      Psychiatric:  There is no history of abusive relation year old female who presents with a right breast cancer is awaiting right skin-sparing mastectomy. Discussion and Plan:   We reviewed the options for post-mastectomy reconstruction and discussed the risks/benefits of  timing (immediate versus delayed) a necrosis, asymmetry, injury to adjacent structures, abdominal hernia/weakness/bulge, need for further surgery, and venous thromboembolism were reviewed.  The expected post-operative course was discussed including the need for activity limitation, drains, an

## 2020-08-12 ENCOUNTER — NURSE NAVIGATOR ENCOUNTER (OUTPATIENT)
Dept: HEMATOLOGY/ONCOLOGY | Facility: HOSPITAL | Age: 65
End: 2020-08-12

## 2020-08-12 NOTE — PROGRESS NOTES
Spoke with patient regarding plan of care. Pt is considering post mastectomy radiation and is meeting with them on Friday. In this case, she will proceed with delayed reconstruction.  She is currently planning to proceed with R sided mastectomy only, but if

## 2020-08-14 ENCOUNTER — HOSPITAL ENCOUNTER (OUTPATIENT)
Dept: RADIATION ONCOLOGY | Facility: HOSPITAL | Age: 65
Discharge: HOME | End: 2020-08-14
Attending: INTERNAL MEDICINE
Payer: COMMERCIAL

## 2020-08-14 VITALS
SYSTOLIC BLOOD PRESSURE: 145 MMHG | DIASTOLIC BLOOD PRESSURE: 90 MMHG | WEIGHT: 94.81 LBS | HEIGHT: 58 IN | HEART RATE: 89 BPM | BODY MASS INDEX: 19.9 KG/M2 | RESPIRATION RATE: 16 BRPM | OXYGEN SATURATION: 98 % | TEMPERATURE: 99 F

## 2020-08-14 DIAGNOSIS — C50.911 BREAST CANCER METASTASIZED TO AXILLARY LYMPH NODE, RIGHT (HCC): ICD-10-CM

## 2020-08-14 DIAGNOSIS — C77.3 BREAST CANCER METASTASIZED TO AXILLARY LYMPH NODE, RIGHT (HCC): ICD-10-CM

## 2020-08-14 PROCEDURE — 99214 OFFICE O/P EST MOD 30 MIN: CPT

## 2020-08-14 NOTE — PATIENT INSTRUCTIONS
- CALL [phone] TO SCHEDULE YOUR FOLLOW-UP APPOINTMENT WITH DR. BARGER 2 WEEKS AFTER SURGERY        - IF YOU HAVE ANY QUESTIONS OR CONCERNS, PLEASE CALL [phone]

## 2020-08-14 NOTE — PROGRESS NOTES
Nursing Consultation Note  Patient: Meme Goldstein  YOB: 1955  Age: 63year old  Radiation Oncologist: Dr. Wisam Kenney  Referring Physician: Nuzhat Myles, Dr. Jefferson Davey, Dr. Magdaleno Murphy, Dr. Tarun Ann (PCP)  Diagnosis: RIGHT BREAST CA  Cons Musculoskeletal: Negative. Skin: Negative. Allergic/Immunologic: Negative. Neurological: Negative. Hematological: Negative. Psychiatric/Behavioral: Negative.            Allergies:  No Known Allergies    Current Outpatient Medications   Medi 5      Smokeless tobacco: Never Used      Tobacco comment: stop smoking 35 years ago    Substance and Sexual Activity      Alcohol use: Never        Frequency: Never      Drug use: Never      Sexual activity: Not Currently    Lifestyle      Physical activi directives.   Transportation:  Adequate transportation available for expected visits    Pain:   ;Pain Score: 0   ;    ;

## 2020-08-14 NOTE — CONSULTS
[address] [facility] Oncology [facility] Consultation      Patient Name: Leodan Tsai   MRN: [id number]  PCP: Ramon Cullen MD  Referring Physician: Mg Rothman MD; Noreen Chen MD    Diagnosis Site: right breast  Stage: cT2 N1 M0, overal Medications   Medication Sig Dispense Refill   • Prochlorperazine Maleate (COMPAZINE) 10 mg tablet Take 1 tablet (10 mg total) by mouth every 6 (six) hours as needed for Nausea.  (Patient not taking: Reported on 8/14/2020 ) 30 tablet 3   • Ondansetron HCl ( breast IDC, cT2 N1 M0, overall stage IIB, grade 3, ER+, AL-, HER2+ s/p neoadjuvant TCHP with excellent clinical/radiographic response. Surgical plan is for at least a R mastectomy without reconstruction.  She is doing well post-chemo and presents today to d

## 2020-08-19 ENCOUNTER — GENETICS ENCOUNTER (OUTPATIENT)
Dept: HEMATOLOGY/ONCOLOGY | Facility: HOSPITAL | Age: 65
End: 2020-08-19

## 2020-08-19 NOTE — PROGRESS NOTES
Referring Provider:                    Felix Schilling MD     Additional Provider(s):              Angela Tafoya MD     Reason for Referral:  Xavi Abernathy had blood drawn on 7/28/2020 for genetic testing because of a personal diagnosis of breast cance

## 2020-08-21 ENCOUNTER — TELEPHONE (OUTPATIENT)
Dept: SURGERY | Facility: CLINIC | Age: 65
End: 2020-08-21

## 2020-08-21 DIAGNOSIS — C50.911 MALIGNANT NEOPLASM OF RIGHT FEMALE BREAST, UNSPECIFIED ESTROGEN RECEPTOR STATUS, UNSPECIFIED SITE OF BREAST (HCC): Primary | ICD-10-CM

## 2020-08-21 NOTE — TELEPHONE ENCOUNTER
Spoke to Kapil andrews re surgical scheduling  Sheila has chosen 9/2/2020 for surgery  She was offered earlier date but that did not work for her schedule at work and home

## 2020-08-27 ENCOUNTER — OFFICE VISIT (OUTPATIENT)
Dept: HEMATOLOGY/ONCOLOGY | Facility: HOSPITAL | Age: 65
End: 2020-08-27
Attending: INTERNAL MEDICINE
Payer: COMMERCIAL

## 2020-08-27 VITALS
HEART RATE: 82 BPM | BODY MASS INDEX: 20.07 KG/M2 | WEIGHT: 95.63 LBS | OXYGEN SATURATION: 99 % | DIASTOLIC BLOOD PRESSURE: 88 MMHG | SYSTOLIC BLOOD PRESSURE: 142 MMHG | TEMPERATURE: 98 F | HEIGHT: 57.72 IN | RESPIRATION RATE: 18 BRPM

## 2020-08-27 DIAGNOSIS — C77.3 BREAST CANCER METASTASIZED TO AXILLARY LYMPH NODE, RIGHT (HCC): ICD-10-CM

## 2020-08-27 DIAGNOSIS — L65.8 ALOPECIA DUE TO CYTOTOXIC DRUG: ICD-10-CM

## 2020-08-27 DIAGNOSIS — Z17.0 MALIGNANT NEOPLASM OF OVERLAPPING SITES OF RIGHT BREAST IN FEMALE, ESTROGEN RECEPTOR POSITIVE (HCC): Primary | ICD-10-CM

## 2020-08-27 DIAGNOSIS — C50.811 MALIGNANT NEOPLASM OF OVERLAPPING SITES OF RIGHT BREAST IN FEMALE, ESTROGEN RECEPTOR POSITIVE (HCC): Primary | ICD-10-CM

## 2020-08-27 DIAGNOSIS — T45.1X5A ALOPECIA DUE TO CYTOTOXIC DRUG: ICD-10-CM

## 2020-08-27 DIAGNOSIS — D63.0 ANEMIA COMPLICATING NEOPLASTIC DISEASE: ICD-10-CM

## 2020-08-27 DIAGNOSIS — C50.911 BREAST CANCER METASTASIZED TO AXILLARY LYMPH NODE, RIGHT (HCC): ICD-10-CM

## 2020-08-27 LAB
ALBUMIN SERPL-MCNC: 3.8 G/DL (ref 3.4–5)
ALBUMIN/GLOB SERPL: 1.3 {RATIO} (ref 1–2)
ALP LIVER SERPL-CCNC: 79 U/L (ref 50–130)
ALT SERPL-CCNC: 21 U/L (ref 13–56)
ANION GAP SERPL CALC-SCNC: 3 MMOL/L (ref 0–18)
AST SERPL-CCNC: 18 U/L (ref 15–37)
BASOPHILS # BLD AUTO: 0.02 X10(3) UL (ref 0–0.2)
BASOPHILS NFR BLD AUTO: 0.3 %
BILIRUB SERPL-MCNC: 0.5 MG/DL (ref 0.1–2)
BUN BLD-MCNC: 13 MG/DL (ref 7–18)
BUN/CREAT SERPL: 15.3 (ref 10–20)
CALCIUM BLD-MCNC: 9.1 MG/DL (ref 8.5–10.1)
CHLORIDE SERPL-SCNC: 106 MMOL/L (ref 98–112)
CO2 SERPL-SCNC: 30 MMOL/L (ref 21–32)
CREAT BLD-MCNC: 0.85 MG/DL (ref 0.55–1.02)
DEPRECATED RDW RBC AUTO: 52 FL (ref 35.1–46.3)
EOSINOPHIL # BLD AUTO: 0.06 X10(3) UL (ref 0–0.7)
EOSINOPHIL NFR BLD AUTO: 0.8 %
ERYTHROCYTE [DISTWIDTH] IN BLOOD BY AUTOMATED COUNT: 13.4 % (ref 11–15)
GLOBULIN PLAS-MCNC: 2.9 G/DL (ref 2.8–4.4)
GLUCOSE BLD-MCNC: 94 MG/DL (ref 70–99)
HCT VFR BLD AUTO: 31.5 % (ref 35–48)
HGB BLD-MCNC: 9.9 G/DL (ref 12–16)
IMM GRANULOCYTES # BLD AUTO: 0.02 X10(3) UL (ref 0–1)
IMM GRANULOCYTES NFR BLD: 0.3 %
LYMPHOCYTES # BLD AUTO: 1.11 X10(3) UL (ref 1–4)
LYMPHOCYTES NFR BLD AUTO: 14.7 %
M PROTEIN MFR SERPL ELPH: 6.7 G/DL (ref 6.4–8.2)
MCH RBC QN AUTO: 33.1 PG (ref 26–34)
MCHC RBC AUTO-ENTMCNC: 31.4 G/DL (ref 31–37)
MCV RBC AUTO: 105.4 FL (ref 80–100)
MONOCYTES # BLD AUTO: 0.52 X10(3) UL (ref 0.1–1)
MONOCYTES NFR BLD AUTO: 6.9 %
NEUTROPHILS # BLD AUTO: 5.8 X10 (3) UL (ref 1.5–7.7)
NEUTROPHILS # BLD AUTO: 5.8 X10(3) UL (ref 1.5–7.7)
NEUTROPHILS NFR BLD AUTO: 77 %
OSMOLALITY SERPL CALC.SUM OF ELEC: 288 MOSM/KG (ref 275–295)
PATIENT FASTING Y/N/NP: NO
PLATELET # BLD AUTO: 171 10(3)UL (ref 150–450)
POTASSIUM SERPL-SCNC: 3.7 MMOL/L (ref 3.5–5.1)
RBC # BLD AUTO: 2.99 X10(6)UL (ref 3.8–5.3)
SODIUM SERPL-SCNC: 139 MMOL/L (ref 136–145)
WBC # BLD AUTO: 7.5 X10(3) UL (ref 4–11)

## 2020-08-27 PROCEDURE — 80053 COMPREHEN METABOLIC PANEL: CPT

## 2020-08-27 PROCEDURE — 85025 COMPLETE CBC W/AUTO DIFF WBC: CPT

## 2020-08-27 PROCEDURE — 96417 CHEMO IV INFUS EACH ADDL SEQ: CPT

## 2020-08-27 PROCEDURE — 99214 OFFICE O/P EST MOD 30 MIN: CPT | Performed by: INTERNAL MEDICINE

## 2020-08-27 PROCEDURE — 96413 CHEMO IV INFUSION 1 HR: CPT

## 2020-08-27 RX ORDER — SODIUM CHLORIDE 0.9 % (FLUSH) 0.9 %
10 SYRINGE (ML) INJECTION ONCE
Status: CANCELLED | OUTPATIENT
Start: 2020-08-27

## 2020-08-27 RX ORDER — ACETAMINOPHEN 500 MG
1000 TABLET ORAL ONCE
Status: CANCELLED | OUTPATIENT
Start: 2020-08-27 | End: 2020-08-27

## 2020-08-27 RX ORDER — SODIUM CHLORIDE 0.9 % (FLUSH) 0.9 %
10 SYRINGE (ML) INJECTION ONCE
Status: COMPLETED | OUTPATIENT
Start: 2020-08-27 | End: 2020-08-27

## 2020-08-27 RX ADMIN — SODIUM CHLORIDE 0.9 % (FLUSH) 10 ML: 0.9 % SYRINGE (ML) INJECTION at 13:10:00

## 2020-08-27 NOTE — PROGRESS NOTES
Pt here for C8D1 Perjeta/Kanjinti.   Arrives Ambulating independently, accompanied by Self           Patient reports possible pregnancy since last therapy cycle: No    Modifications in dose or schedule: No     Frequency of blood return and site check throug

## 2020-08-27 NOTE — PROGRESS NOTES
Cancer Center Progress Note    Problem List:      Patient Active Problem List:     Malignant neoplasm of overlapping sites of right breast in female, estrogen receptor positive ([facility] [address]     Breast cancer metastasized to axillary lymph node, right ([facility] [address]     Br Procedure Laterality Date   • BREAST BIOPSY     • CATHETER INSERTION PORT-A-CATH Left 3/25/2020    Performed by Lindsey Will MD at [facility] MAIN OR   • OTHER SURGICAL HISTORY              Family History Reviewed:  Family History   Problem Rela breast cancer:  Clinically she has at least 5 cm of disease  She has breast erythema on the right breast  She has positive yris involvement in the right axilla  Biopsy on 3/11/2020 shows Invasive ductal carcinoma with micropapillary features  Grade III  E

## 2020-08-28 ENCOUNTER — GENETICS ENCOUNTER (OUTPATIENT)
Dept: HEMATOLOGY/ONCOLOGY | Facility: HOSPITAL | Age: 65
End: 2020-08-28

## 2020-08-28 ENCOUNTER — TELEPHONE (OUTPATIENT)
Dept: MAMMOGRAPHY | Age: 65
End: 2020-08-28

## 2020-08-28 NOTE — PROGRESS NOTES
Referring Provider:                    Katherine Montilla MD     Additional Provider(s):              Renee Arenas MD     Reason for Referral:  Shahbaz Fenton had genetic testing performed on 7/28/2020 because of a new diagnosis of breast cancer.      Gene

## 2020-08-28 NOTE — TELEPHONE ENCOUNTER
Spoke with pt and provided pre-procedure instructions for RIGHT AXILLARY WIRE LOC scheduled prior to surgery on 9/2/20.  I explained that pt will travel through the lobby to the [facility] to have LOC preformed by the Radiologist. Pt verbalized understanding, answ

## 2020-08-31 ENCOUNTER — APPOINTMENT (OUTPATIENT)
Dept: LAB | Facility: HOSPITAL | Age: 65
End: 2020-08-31
Attending: SURGERY
Payer: COMMERCIAL

## 2020-08-31 DIAGNOSIS — Z17.0 MALIGNANT NEOPLASM OF OVERLAPPING SITES OF RIGHT BREAST IN FEMALE, ESTROGEN RECEPTOR POSITIVE (HCC): ICD-10-CM

## 2020-08-31 DIAGNOSIS — C50.811 MALIGNANT NEOPLASM OF OVERLAPPING SITES OF RIGHT BREAST IN FEMALE, ESTROGEN RECEPTOR POSITIVE (HCC): ICD-10-CM

## 2020-09-01 LAB — SARS-COV-2 RNA RESP QL NAA+PROBE: NOT DETECTED

## 2020-09-02 ENCOUNTER — HOSPITAL ENCOUNTER (OUTPATIENT)
Facility: HOSPITAL | Age: 65
Setting detail: HOSPITAL OUTPATIENT SURGERY
Discharge: HOME | End: 2020-09-02
Attending: SURGERY | Admitting: SURGERY
Payer: COMMERCIAL

## 2020-09-02 ENCOUNTER — APPOINTMENT (OUTPATIENT)
Dept: MAMMOGRAPHY | Facility: HOSPITAL | Age: 65
End: 2020-09-02
Attending: SURGERY
Payer: COMMERCIAL

## 2020-09-02 ENCOUNTER — ANESTHESIA EVENT (OUTPATIENT)
Dept: SURGERY | Facility: HOSPITAL | Age: 65
End: 2020-09-02
Payer: COMMERCIAL

## 2020-09-02 ENCOUNTER — ANESTHESIA (OUTPATIENT)
Dept: SURGERY | Facility: HOSPITAL | Age: 65
End: 2020-09-02
Payer: COMMERCIAL

## 2020-09-02 VITALS
HEIGHT: 58 IN | OXYGEN SATURATION: 96 % | RESPIRATION RATE: 16 BRPM | DIASTOLIC BLOOD PRESSURE: 82 MMHG | SYSTOLIC BLOOD PRESSURE: 145 MMHG | WEIGHT: 95 LBS | BODY MASS INDEX: 19.94 KG/M2 | TEMPERATURE: 98 F | HEART RATE: 78 BPM

## 2020-09-02 DIAGNOSIS — C50.811 MALIGNANT NEOPLASM OF OVERLAPPING SITES OF RIGHT BREAST IN FEMALE, ESTROGEN RECEPTOR POSITIVE (HCC): Primary | ICD-10-CM

## 2020-09-02 DIAGNOSIS — Z17.0 MALIGNANT NEOPLASM OF OVERLAPPING SITES OF RIGHT BREAST IN FEMALE, ESTROGEN RECEPTOR POSITIVE (HCC): Primary | ICD-10-CM

## 2020-09-02 DIAGNOSIS — C50.911 MALIGNANT NEOPLASM OF RIGHT FEMALE BREAST, UNSPECIFIED ESTROGEN RECEPTOR STATUS, UNSPECIFIED SITE OF BREAST (HCC): ICD-10-CM

## 2020-09-02 PROCEDURE — 77065 DX MAMMO INCL CAD UNI: CPT | Performed by: SURGERY

## 2020-09-02 PROCEDURE — 07T50ZZ RESECTION OF RIGHT AXILLARY LYMPHATIC, OPEN APPROACH: ICD-10-PCS | Performed by: SURGERY

## 2020-09-02 PROCEDURE — 0HTT0ZZ RESECTION OF RIGHT BREAST, OPEN APPROACH: ICD-10-PCS | Performed by: SURGERY

## 2020-09-02 PROCEDURE — 76098 X-RAY EXAM SURGICAL SPECIMEN: CPT | Performed by: SURGERY

## 2020-09-02 PROCEDURE — 3E0T3BZ INTRODUCTION OF ANESTHETIC AGENT INTO PERIPHERAL NERVES AND PLEXI, PERCUTANEOUS APPROACH: ICD-10-PCS | Performed by: ANESTHESIOLOGY

## 2020-09-02 PROCEDURE — 0XH83YZ INSERTION OF OTHER DEVICE INTO RIGHT UPPER ARM, PERCUTANEOUS APPROACH: ICD-10-PCS | Performed by: RADIOLOGY

## 2020-09-02 PROCEDURE — 88307 TISSUE EXAM BY PATHOLOGIST: CPT | Performed by: SURGERY

## 2020-09-02 PROCEDURE — 76942 ECHO GUIDE FOR BIOPSY: CPT | Performed by: ANESTHESIOLOGY

## 2020-09-02 PROCEDURE — 10035 PLMT SFT TISS LOCLZJ DEV 1ST: CPT | Performed by: SURGERY

## 2020-09-02 RX ORDER — ACETAMINOPHEN 500 MG
1000 TABLET ORAL ONCE
Status: ON HOLD | COMMUNITY
End: 2020-09-02

## 2020-09-02 RX ORDER — HEPARIN SODIUM 5000 [USP'U]/ML
5000 INJECTION, SOLUTION INTRAVENOUS; SUBCUTANEOUS ONCE
Status: COMPLETED | OUTPATIENT
Start: 2020-09-02 | End: 2020-09-02

## 2020-09-02 RX ORDER — HYDROMORPHONE HYDROCHLORIDE 1 MG/ML
0.4 INJECTION, SOLUTION INTRAMUSCULAR; INTRAVENOUS; SUBCUTANEOUS EVERY 5 MIN PRN
Status: DISCONTINUED | OUTPATIENT
Start: 2020-09-02 | End: 2020-09-02

## 2020-09-02 RX ORDER — SODIUM CHLORIDE, SODIUM LACTATE, POTASSIUM CHLORIDE, CALCIUM CHLORIDE 600; 310; 30; 20 MG/100ML; MG/100ML; MG/100ML; MG/100ML
INJECTION, SOLUTION INTRAVENOUS CONTINUOUS
Status: DISCONTINUED | OUTPATIENT
Start: 2020-09-02 | End: 2020-09-02

## 2020-09-02 RX ORDER — ONDANSETRON 2 MG/ML
INJECTION INTRAMUSCULAR; INTRAVENOUS AS NEEDED
Status: DISCONTINUED | OUTPATIENT
Start: 2020-09-02 | End: 2020-09-02 | Stop reason: SURG

## 2020-09-02 RX ORDER — ONDANSETRON 2 MG/ML
4 INJECTION INTRAMUSCULAR; INTRAVENOUS AS NEEDED
Status: DISCONTINUED | OUTPATIENT
Start: 2020-09-02 | End: 2020-09-02

## 2020-09-02 RX ORDER — CEFAZOLIN SODIUM/WATER 2 G/20 ML
2 SYRINGE (ML) INTRAVENOUS ONCE
Status: COMPLETED | OUTPATIENT
Start: 2020-09-02 | End: 2020-09-02

## 2020-09-02 RX ORDER — LIDOCAINE HYDROCHLORIDE 10 MG/ML
INJECTION, SOLUTION EPIDURAL; INFILTRATION; INTRACAUDAL; PERINEURAL AS NEEDED
Status: DISCONTINUED | OUTPATIENT
Start: 2020-09-02 | End: 2020-09-02 | Stop reason: SURG

## 2020-09-02 RX ORDER — HYDROCODONE BITARTRATE AND ACETAMINOPHEN 5; 325 MG/1; MG/1
2 TABLET ORAL AS NEEDED
Status: DISCONTINUED | OUTPATIENT
Start: 2020-09-02 | End: 2020-09-02

## 2020-09-02 RX ORDER — DIAZEPAM 5 MG/1
5 TABLET ORAL AS NEEDED
Status: DISCONTINUED | OUTPATIENT
Start: 2020-09-02 | End: 2020-09-02 | Stop reason: HOSPADM

## 2020-09-02 RX ORDER — HYDROCODONE BITARTRATE AND ACETAMINOPHEN 5; 325 MG/1; MG/1
1 TABLET ORAL EVERY 6 HOURS PRN
Qty: 20 TABLET | Refills: 0 | Status: SHIPPED | OUTPATIENT
Start: 2020-09-02 | End: 2020-09-08

## 2020-09-02 RX ORDER — BUPIVACAINE HYDROCHLORIDE AND EPINEPHRINE 5; 5 MG/ML; UG/ML
INJECTION, SOLUTION EPIDURAL; INTRACAUDAL; PERINEURAL AS NEEDED
Status: DISCONTINUED | OUTPATIENT
Start: 2020-09-02 | End: 2020-09-02 | Stop reason: HOSPADM

## 2020-09-02 RX ORDER — DEXAMETHASONE SODIUM PHOSPHATE 4 MG/ML
VIAL (ML) INJECTION AS NEEDED
Status: DISCONTINUED | OUTPATIENT
Start: 2020-09-02 | End: 2020-09-02 | Stop reason: SURG

## 2020-09-02 RX ORDER — HYDROMORPHONE HYDROCHLORIDE 1 MG/ML
INJECTION, SOLUTION INTRAMUSCULAR; INTRAVENOUS; SUBCUTANEOUS
Status: COMPLETED
Start: 2020-09-02 | End: 2020-09-02

## 2020-09-02 RX ORDER — NALOXONE HYDROCHLORIDE 0.4 MG/ML
80 INJECTION, SOLUTION INTRAMUSCULAR; INTRAVENOUS; SUBCUTANEOUS AS NEEDED
Status: DISCONTINUED | OUTPATIENT
Start: 2020-09-02 | End: 2020-09-02

## 2020-09-02 RX ORDER — EPHEDRINE SULFATE 50 MG/ML
INJECTION, SOLUTION INTRAVENOUS AS NEEDED
Status: DISCONTINUED | OUTPATIENT
Start: 2020-09-02 | End: 2020-09-02 | Stop reason: SURG

## 2020-09-02 RX ORDER — BUPIVACAINE HYDROCHLORIDE 2.5 MG/ML
INJECTION, SOLUTION EPIDURAL; INFILTRATION; INTRACAUDAL AS NEEDED
Status: DISCONTINUED | OUTPATIENT
Start: 2020-09-02 | End: 2020-09-02 | Stop reason: SURG

## 2020-09-02 RX ORDER — HYDROCODONE BITARTRATE AND ACETAMINOPHEN 5; 325 MG/1; MG/1
1 TABLET ORAL AS NEEDED
Status: DISCONTINUED | OUTPATIENT
Start: 2020-09-02 | End: 2020-09-02

## 2020-09-02 RX ADMIN — ONDANSETRON 4 MG: 2 INJECTION INTRAMUSCULAR; INTRAVENOUS at 14:04:00

## 2020-09-02 RX ADMIN — EPHEDRINE SULFATE 10 MG: 50 INJECTION, SOLUTION INTRAVENOUS at 12:34:00

## 2020-09-02 RX ADMIN — DEXAMETHASONE SODIUM PHOSPHATE 8 MG: 4 MG/ML VIAL (ML) INJECTION at 12:31:00

## 2020-09-02 RX ADMIN — CEFAZOLIN SODIUM/WATER 2 G: 2 G/20 ML SYRINGE (ML) INTRAVENOUS at 12:23:00

## 2020-09-02 RX ADMIN — SODIUM CHLORIDE, SODIUM LACTATE, POTASSIUM CHLORIDE, CALCIUM CHLORIDE: 600; 310; 30; 20 INJECTION, SOLUTION INTRAVENOUS at 14:37:00

## 2020-09-02 RX ADMIN — BUPIVACAINE HYDROCHLORIDE 20 ML: 2.5 INJECTION, SOLUTION EPIDURAL; INFILTRATION; INTRACAUDAL at 14:24:00

## 2020-09-02 RX ADMIN — LIDOCAINE HYDROCHLORIDE 40 MG: 10 INJECTION, SOLUTION EPIDURAL; INFILTRATION; INTRACAUDAL; PERINEURAL at 12:14:00

## 2020-09-02 NOTE — ANESTHESIA PROCEDURE NOTES
Airway  Date/Time: 9/2/2020 12:17 PM  Urgency: elective    Airway not difficult    General Information and Staff    Patient location during procedure: OR  Anesthesiologist: Mauro Morrell MD  Performed: anesthesiologist     Indications and Patient Conditio

## 2020-09-02 NOTE — ANESTHESIA PROCEDURE NOTES
Regional Block  Performed by: Juan Moise MD  Authorized by: Juan Moise MD       General Information and Staff    Start Time:  9/2/2020 2:20 PM  End Time:  9/2/2020 2:24 PM  Anesthesiologist:  Juan Moise MD  Performed by:   Anesthesiologist  Ally

## 2020-09-02 NOTE — IMAGING NOTE
Assisted Dr. Queenie Pina with needle localization of R axillary node for lumpectomy. Procedure explained and all questions answered. Pt verbalized understanding. Emotional support provided and pt tolerated procedure well with minimal discomfort.  Truloc wire sec

## 2020-09-02 NOTE — OPERATIVE REPORT
[facility]  Bilateral Mastectomy Op Note    Terrial Jeanne Location: OR   [facility] [id number] MRN [id number]   Admission Date 9/2/2020 Operation Date 9/2/2020   Attending Physician Gayle Donohue MD Operating Physician Chelly Dalal MD     Date position-9 mm grade 3 IDC  Ultrasound core biopsy right axillary lymph node-7 mm metastatic adenocarcinoma     Tumor is %, VT 0%, CONNIE 60%, HER-2 positive     Sheila has completed neoadjuvant chemotherapy under the direction of Dr. Cathy Mann  She has seen D not have any further questions at this time and wish to proceed with surgery. The procedure was confirmed with the patient . The patient was marked by the Plastic Surgery service.       Surgeon(s) and Role:     * Cam Holleyr, MD - Primary    Assistant suture. Steri-Strips and a sterile dressing was placed. The patient was wrapped with Ace bandage. All sponge instrument and needle counts were correct at the conclusion of this portion of the procedure.  The patient then underwent immediate bilateral r

## 2020-09-02 NOTE — H&P
History & Physical Examination    Patient Name: Autumn Escobedo  MRN: [id number]  CSN: [id number]  YOB: 1955    Diagnosis: Right breast cancer with axillary yris metastases. Patient is status post neoadjuvant chemotherapy.     [facility] Genetic testing has been negative. Sheila did have bilateral breast MRI 7/23/2020. This showed good response in the right breast and axilla with no further enhancement noted.   No areas of enhancement noted in the left breast.    Treatment opti Use      Smoking status: Former Smoker        Packs/day: 0.50        Years: 10.00        Pack years: 5      Smokeless tobacco: Never Used      Tobacco comment: stop smoking 35 years ago    Alcohol use: Never      Frequency: Never      SYSTEM Check if Revie

## 2020-09-02 NOTE — ANESTHESIA PREPROCEDURE EVALUATION
PRE-OP EVALUATION    Patient Name: Rupert Fitzgerald    Pre-op Diagnosis: Malignant neoplasm of right female breast, unspecified estrogen receptor status, unspecified site of breast ([facility] [C50.911]    Procedure(s):  RIGHT BREAST MASTECTOMY, RIGHT AXILLARY Rhea Saenz MD at [facility] MAIN OR   • OTHER SURGICAL HISTORY          • PORT, INDWELLING, IMP  2020     Social History    Tobacco Use      Smoking status: Former Smoker        Packs/day: 0.50        Years: 10.00        Pack years: 5      Smokel

## 2020-09-02 NOTE — ANESTHESIA POSTPROCEDURE EVALUATION
Barbie [address] Patient Status:  Hospital Outpatient Surgery   Age/Gender 63year old female MRN [id number]   [facility] SURGERY Attending Abran Rivas, [address] Day [address] 0 PCP Casey Steel MD       Anesthesia Post-o

## 2020-09-08 ENCOUNTER — OFFICE VISIT (OUTPATIENT)
Dept: SURGERY | Facility: CLINIC | Age: 65
End: 2020-09-08

## 2020-09-08 VITALS — TEMPERATURE: 98 F | HEART RATE: 82 BPM | DIASTOLIC BLOOD PRESSURE: 87 MMHG | SYSTOLIC BLOOD PRESSURE: 138 MMHG

## 2020-09-08 DIAGNOSIS — Z90.11 S/P RIGHT MASTECTOMY: ICD-10-CM

## 2020-09-08 DIAGNOSIS — C50.811 MALIGNANT NEOPLASM OF OVERLAPPING SITES OF RIGHT BREAST IN FEMALE, ESTROGEN RECEPTOR POSITIVE (HCC): Primary | ICD-10-CM

## 2020-09-08 DIAGNOSIS — C77.3 BREAST CANCER METASTASIZED TO AXILLARY LYMPH NODE, RIGHT (HCC): ICD-10-CM

## 2020-09-08 DIAGNOSIS — C50.911 BREAST CANCER METASTASIZED TO AXILLARY LYMPH NODE, RIGHT (HCC): ICD-10-CM

## 2020-09-08 DIAGNOSIS — Z17.0 MALIGNANT NEOPLASM OF OVERLAPPING SITES OF RIGHT BREAST IN FEMALE, ESTROGEN RECEPTOR POSITIVE (HCC): Primary | ICD-10-CM

## 2020-09-08 PROCEDURE — 3075F SYST BP GE 130 - 139MM HG: CPT | Performed by: SURGERY

## 2020-09-08 PROCEDURE — 3079F DIAST BP 80-89 MM HG: CPT | Performed by: SURGERY

## 2020-09-08 PROCEDURE — 99024 POSTOP FOLLOW-UP VISIT: CPT | Performed by: SURGERY

## 2020-09-08 NOTE — PROGRESS NOTES
Follow Up Visit Note       Active Problems      1. Malignant neoplasm of overlapping sites of right breast in female, estrogen receptor positive ([facility] [address]    2. Breast cancer metastasized to axillary lymph node, right ([address]    3.  S/P right mastectomy diaphoresis, fever and unexpected weight change. HENT: Negative for congestion, nosebleeds, sore throat and trouble swallowing. Eyes: Negative for pain, discharge, redness and visual disturbance.    Respiratory: Negative for cough, shortness of breath irregular hypoechoic mass in the upper outer quadrant 10 o'clock position, 3.4 cm mass with increased vascularity seen near the 10 o'clock position subareolar region  No abnormalities left breast     [facility]-bilateral breast MRI reveals 2.3 cm retr Tylenol. She is to follow-up with radiation oncology to discuss postmastectomy radiation in light of complete pathologic response on final pathology.   She will follow-up with Dr. Shila Cho to discuss hormone therapy    Sheila will follow-up with me in 2 to 3

## 2020-09-09 ENCOUNTER — NURSE NAVIGATOR ENCOUNTER (OUTPATIENT)
Dept: HEMATOLOGY/ONCOLOGY | Facility: HOSPITAL | Age: 65
End: 2020-09-09

## 2020-09-09 NOTE — PROGRESS NOTES
Spoke with patient regarding post operative plan of care. Pt states she is doing well. She is starting to feel normal again. Pt met with Dr. Jefferson Davey yesterday, reviewed pathology and complete pathologic response.  She is going to follow up with Dr. Gurdeep Jameson to

## 2020-09-16 ENCOUNTER — OFFICE VISIT (OUTPATIENT)
Dept: HEMATOLOGY/ONCOLOGY | Facility: HOSPITAL | Age: 65
End: 2020-09-16
Attending: INTERNAL MEDICINE
Payer: COMMERCIAL

## 2020-09-16 VITALS
OXYGEN SATURATION: 99 % | HEIGHT: 57.72 IN | TEMPERATURE: 96 F | BODY MASS INDEX: 19.81 KG/M2 | RESPIRATION RATE: 16 BRPM | WEIGHT: 94.38 LBS | SYSTOLIC BLOOD PRESSURE: 157 MMHG | DIASTOLIC BLOOD PRESSURE: 86 MMHG | HEART RATE: 80 BPM

## 2020-09-16 DIAGNOSIS — Z78.0 ASYMPTOMATIC MENOPAUSE: ICD-10-CM

## 2020-09-16 DIAGNOSIS — Z17.0 MALIGNANT NEOPLASM OF OVERLAPPING SITES OF RIGHT BREAST IN FEMALE, ESTROGEN RECEPTOR POSITIVE (HCC): Primary | ICD-10-CM

## 2020-09-16 DIAGNOSIS — D63.0 ANEMIA COMPLICATING NEOPLASTIC DISEASE: ICD-10-CM

## 2020-09-16 DIAGNOSIS — C77.3 BREAST CANCER METASTASIZED TO AXILLARY LYMPH NODE, RIGHT (HCC): ICD-10-CM

## 2020-09-16 DIAGNOSIS — C50.911 BREAST CANCER METASTASIZED TO AXILLARY LYMPH NODE, RIGHT (HCC): ICD-10-CM

## 2020-09-16 DIAGNOSIS — C50.811 MALIGNANT NEOPLASM OF OVERLAPPING SITES OF RIGHT BREAST IN FEMALE, ESTROGEN RECEPTOR POSITIVE (HCC): Primary | ICD-10-CM

## 2020-09-16 LAB
ALBUMIN SERPL-MCNC: 3.6 G/DL (ref 3.4–5)
ALBUMIN/GLOB SERPL: 1 {RATIO} (ref 1–2)
ALP LIVER SERPL-CCNC: 84 U/L (ref 50–130)
ALT SERPL-CCNC: 24 U/L (ref 13–56)
ANION GAP SERPL CALC-SCNC: 4 MMOL/L (ref 0–18)
AST SERPL-CCNC: 18 U/L (ref 15–37)
BASOPHILS # BLD AUTO: 0.02 X10(3) UL (ref 0–0.2)
BASOPHILS NFR BLD AUTO: 0.3 %
BILIRUB SERPL-MCNC: 0.4 MG/DL (ref 0.1–2)
BUN BLD-MCNC: 14 MG/DL (ref 7–18)
BUN/CREAT SERPL: 14.6 (ref 10–20)
CALCIUM BLD-MCNC: 8.7 MG/DL (ref 8.5–10.1)
CHLORIDE SERPL-SCNC: 106 MMOL/L (ref 98–112)
CO2 SERPL-SCNC: 29 MMOL/L (ref 21–32)
CREAT BLD-MCNC: 0.96 MG/DL (ref 0.55–1.02)
DEPRECATED RDW RBC AUTO: 43.8 FL (ref 35.1–46.3)
EOSINOPHIL # BLD AUTO: 0.11 X10(3) UL (ref 0–0.7)
EOSINOPHIL NFR BLD AUTO: 1.8 %
ERYTHROCYTE [DISTWIDTH] IN BLOOD BY AUTOMATED COUNT: 11.9 % (ref 11–15)
GLOBULIN PLAS-MCNC: 3.5 G/DL (ref 2.8–4.4)
GLUCOSE BLD-MCNC: 94 MG/DL (ref 70–99)
HCT VFR BLD AUTO: 33.9 % (ref 35–48)
HGB BLD-MCNC: 10.9 G/DL (ref 12–16)
IMM GRANULOCYTES # BLD AUTO: 0.02 X10(3) UL (ref 0–1)
IMM GRANULOCYTES NFR BLD: 0.3 %
LYMPHOCYTES # BLD AUTO: 1.29 X10(3) UL (ref 1–4)
LYMPHOCYTES NFR BLD AUTO: 20.9 %
M PROTEIN MFR SERPL ELPH: 7.1 G/DL (ref 6.4–8.2)
MCH RBC QN AUTO: 32.1 PG (ref 26–34)
MCHC RBC AUTO-ENTMCNC: 32.2 G/DL (ref 31–37)
MCV RBC AUTO: 99.7 FL (ref 80–100)
MONOCYTES # BLD AUTO: 0.37 X10(3) UL (ref 0.1–1)
MONOCYTES NFR BLD AUTO: 6 %
NEUTROPHILS # BLD AUTO: 4.36 X10 (3) UL (ref 1.5–7.7)
NEUTROPHILS # BLD AUTO: 4.36 X10(3) UL (ref 1.5–7.7)
NEUTROPHILS NFR BLD AUTO: 70.7 %
OSMOLALITY SERPL CALC.SUM OF ELEC: 288 MOSM/KG (ref 275–295)
PATIENT FASTING Y/N/NP: NO
PLATELET # BLD AUTO: 217 10(3)UL (ref 150–450)
POTASSIUM SERPL-SCNC: 3.6 MMOL/L (ref 3.5–5.1)
RBC # BLD AUTO: 3.4 X10(6)UL (ref 3.8–5.3)
SODIUM SERPL-SCNC: 139 MMOL/L (ref 136–145)
WBC # BLD AUTO: 6.2 X10(3) UL (ref 4–11)

## 2020-09-16 PROCEDURE — 36591 DRAW BLOOD OFF VENOUS DEVICE: CPT

## 2020-09-16 PROCEDURE — 99214 OFFICE O/P EST MOD 30 MIN: CPT | Performed by: INTERNAL MEDICINE

## 2020-09-16 PROCEDURE — 80053 COMPREHEN METABOLIC PANEL: CPT

## 2020-09-16 PROCEDURE — 85025 COMPLETE CBC W/AUTO DIFF WBC: CPT

## 2020-09-16 RX ORDER — ANASTROZOLE 1 MG/1
1 TABLET ORAL DAILY
Qty: 90 TABLET | Refills: 0 | Status: SHIPPED | OUTPATIENT
Start: 2020-09-16 | End: 2020-12-10

## 2020-09-18 ENCOUNTER — OFFICE VISIT (OUTPATIENT)
Dept: HEMATOLOGY/ONCOLOGY | Facility: HOSPITAL | Age: 65
End: 2020-09-18
Attending: INTERNAL MEDICINE
Payer: COMMERCIAL

## 2020-09-18 VITALS
RESPIRATION RATE: 16 BRPM | SYSTOLIC BLOOD PRESSURE: 164 MMHG | HEIGHT: 57.72 IN | DIASTOLIC BLOOD PRESSURE: 87 MMHG | BODY MASS INDEX: 19.9 KG/M2 | HEART RATE: 80 BPM | OXYGEN SATURATION: 99 % | WEIGHT: 94.81 LBS | TEMPERATURE: 98 F

## 2020-09-18 DIAGNOSIS — C50.811 MALIGNANT NEOPLASM OF OVERLAPPING SITES OF RIGHT BREAST IN FEMALE, ESTROGEN RECEPTOR POSITIVE (HCC): Primary | ICD-10-CM

## 2020-09-18 DIAGNOSIS — C50.911 BREAST CANCER METASTASIZED TO AXILLARY LYMPH NODE, RIGHT (HCC): ICD-10-CM

## 2020-09-18 DIAGNOSIS — C77.3 BREAST CANCER METASTASIZED TO AXILLARY LYMPH NODE, RIGHT (HCC): ICD-10-CM

## 2020-09-18 DIAGNOSIS — Z17.0 MALIGNANT NEOPLASM OF OVERLAPPING SITES OF RIGHT BREAST IN FEMALE, ESTROGEN RECEPTOR POSITIVE (HCC): Primary | ICD-10-CM

## 2020-09-18 PROCEDURE — 96417 CHEMO IV INFUS EACH ADDL SEQ: CPT

## 2020-09-18 PROCEDURE — 96413 CHEMO IV INFUSION 1 HR: CPT

## 2020-09-18 NOTE — PROGRESS NOTES
Pt here for C9D1.   Arrives Ambulating independently, accompanied by Self           Patient reports possible pregnancy since last therapy cycle: No    Modifications in dose or schedule: No     Frequency of blood return and site check throughout administrati

## 2020-09-21 ENCOUNTER — HOSPITAL ENCOUNTER (OUTPATIENT)
Dept: BONE DENSITY | Age: 65
Discharge: HOME | End: 2020-09-21
Attending: INTERNAL MEDICINE
Payer: COMMERCIAL

## 2020-09-21 DIAGNOSIS — C50.811 MALIGNANT NEOPLASM OF OVERLAPPING SITES OF RIGHT BREAST IN FEMALE, ESTROGEN RECEPTOR POSITIVE (HCC): ICD-10-CM

## 2020-09-21 DIAGNOSIS — Z78.0 ASYMPTOMATIC MENOPAUSE: ICD-10-CM

## 2020-09-21 DIAGNOSIS — Z17.0 MALIGNANT NEOPLASM OF OVERLAPPING SITES OF RIGHT BREAST IN FEMALE, ESTROGEN RECEPTOR POSITIVE (HCC): ICD-10-CM

## 2020-09-21 PROCEDURE — 77080 DXA BONE DENSITY AXIAL: CPT | Performed by: INTERNAL MEDICINE

## 2020-09-25 NOTE — PROGRESS NOTES
Cancer Center Progress Note    Problem List:      Patient Active Problem List:     Malignant neoplasm of overlapping sites of right breast in female, estrogen receptor positive ([facility] [address]     Breast cancer metastasized to axillary lymph node, right ([facility] [address]     Br focal weakness. Psychiatric: The patient's mood was calm and appropriate for this visit. The pertinent positives and negatives were described. All other systems were negative.     PMH/PSH:  Past Medical History:   Diagnosis Date   • Cancer of overlapping  09/16/2020    K 3.6 09/16/2020     09/16/2020    CO2 29.0 09/16/2020    BUN 14 09/16/2020    CREATSERUM 0.96 09/16/2020    GLU 94 09/16/2020    CA 8.7 09/16/2020    ALKPHO 84 09/16/2020    ALT 24 09/16/2020    AST 18 09/16/2020    BILT 0.4 09/

## 2020-10-02 ENCOUNTER — HOSPITAL ENCOUNTER (OUTPATIENT)
Dept: RADIATION ONCOLOGY | Facility: HOSPITAL | Age: 65
Discharge: HOME | End: 2020-10-02
Attending: INTERNAL MEDICINE
Payer: COMMERCIAL

## 2020-10-02 VITALS
DIASTOLIC BLOOD PRESSURE: 76 MMHG | RESPIRATION RATE: 18 BRPM | TEMPERATURE: 98 F | HEART RATE: 84 BPM | OXYGEN SATURATION: 99 % | SYSTOLIC BLOOD PRESSURE: 160 MMHG

## 2020-10-02 DIAGNOSIS — C50.911 BREAST CANCER METASTASIZED TO AXILLARY LYMPH NODE, RIGHT (HCC): Primary | ICD-10-CM

## 2020-10-02 DIAGNOSIS — C77.3 BREAST CANCER METASTASIZED TO AXILLARY LYMPH NODE, RIGHT (HCC): Primary | ICD-10-CM

## 2020-10-02 PROCEDURE — 99213 OFFICE O/P EST LOW 20 MIN: CPT

## 2020-10-02 RX ORDER — MOMETASONE FUROATE 1 MG/G
CREAM TOPICAL
Qty: 50 G | Refills: 3 | Status: SHIPPED | OUTPATIENT
Start: 2020-10-02 | End: 2021-01-19 | Stop reason: ALTCHOICE

## 2020-10-02 NOTE — PROGRESS NOTES
Nursing Follow-Up Note    Patient: Merribeth Favre  YOB: 1955  Age: 63year old  Radiation Oncologist: Dr. Ruperto Vo  Referring Physician: Hudson Gann  Chief Complaint: Patient presents with:   Follow - Up    Date: 10/2/2020    Tox

## 2020-10-02 NOTE — PROGRESS NOTES
[address] [facility] Oncology Follow-up    Patient Name: Xavi Abernathy   MRN: [id number]  Referring Physician: Felix Schilling MD; Angela Tafoya MD    Diagnosis: right breast IDC, grade 3, ER+, OK-, HER2+, Ki-67 high, cT2 N1 M0, overall sta no skin changes, no dimpling, no nipple changes, no discharge or erythema, no edema, no masses. Left axilla without edema, masses, or LAD.      Assessment: 63yo F with clinically detected R breast IDC, cT2 N1 M0, overall stage IIB, grade 3, ER+, [place]-, HER2+

## 2020-10-02 NOTE — PATIENT INSTRUCTIONS
- CT SIMULATION SCAN SCHEDULED FOR: Friday 10/9 at 9:30 am    -Dr Carli Obregon will send a new prescription to your pharmacy for Mometasone/Elocon cream-this is a steroid cream you will apply as directed when you start radiation treatments    -Dr Carli Obregon will

## 2020-10-09 ENCOUNTER — APPOINTMENT (OUTPATIENT)
Dept: RADIATION ONCOLOGY | Facility: HOSPITAL | Age: 65
End: 2020-10-09
Attending: INTERNAL MEDICINE
Payer: COMMERCIAL

## 2020-10-09 ENCOUNTER — OFFICE VISIT (OUTPATIENT)
Dept: HEMATOLOGY/ONCOLOGY | Facility: HOSPITAL | Age: 65
End: 2020-10-09
Attending: INTERNAL MEDICINE
Payer: COMMERCIAL

## 2020-10-09 VITALS
HEART RATE: 80 BPM | BODY MASS INDEX: 19.69 KG/M2 | TEMPERATURE: 98 F | HEIGHT: 57.72 IN | OXYGEN SATURATION: 99 % | RESPIRATION RATE: 18 BRPM | DIASTOLIC BLOOD PRESSURE: 83 MMHG | WEIGHT: 93.81 LBS | SYSTOLIC BLOOD PRESSURE: 143 MMHG

## 2020-10-09 DIAGNOSIS — C50.911 BREAST CANCER METASTASIZED TO AXILLARY LYMPH NODE, RIGHT (HCC): ICD-10-CM

## 2020-10-09 DIAGNOSIS — C50.811 MALIGNANT NEOPLASM OF OVERLAPPING SITES OF RIGHT BREAST IN FEMALE, ESTROGEN RECEPTOR POSITIVE (HCC): Primary | ICD-10-CM

## 2020-10-09 DIAGNOSIS — Z17.0 MALIGNANT NEOPLASM OF OVERLAPPING SITES OF RIGHT BREAST IN FEMALE, ESTROGEN RECEPTOR POSITIVE (HCC): Primary | ICD-10-CM

## 2020-10-09 DIAGNOSIS — C77.3 BREAST CANCER METASTASIZED TO AXILLARY LYMPH NODE, RIGHT (HCC): ICD-10-CM

## 2020-10-09 PROCEDURE — 96413 CHEMO IV INFUSION 1 HR: CPT

## 2020-10-09 PROCEDURE — 96417 CHEMO IV INFUS EACH ADDL SEQ: CPT

## 2020-10-09 NOTE — PROGRESS NOTES
Pt here for C10.   Arrives Ambulating independently, accompanied by Self           Patient reports possible pregnancy since last therapy cycle: No    Modifications in dose or schedule: No     Frequency of blood return and site check throughout administratio

## 2020-10-12 ENCOUNTER — APPOINTMENT (OUTPATIENT)
Dept: RADIATION ONCOLOGY | Facility: HOSPITAL | Age: 65
End: 2020-10-12
Attending: INTERNAL MEDICINE
Payer: COMMERCIAL

## 2020-10-13 ENCOUNTER — APPOINTMENT (OUTPATIENT)
Dept: RADIATION ONCOLOGY | Facility: HOSPITAL | Age: 65
End: 2020-10-13
Attending: INTERNAL MEDICINE
Payer: COMMERCIAL

## 2020-10-14 ENCOUNTER — APPOINTMENT (OUTPATIENT)
Dept: RADIATION ONCOLOGY | Facility: HOSPITAL | Age: 65
End: 2020-10-14
Attending: INTERNAL MEDICINE
Payer: COMMERCIAL

## 2020-10-15 ENCOUNTER — HOSPITAL ENCOUNTER (OUTPATIENT)
Dept: RADIATION ONCOLOGY | Facility: HOSPITAL | Age: 65
Discharge: HOME | End: 2020-10-15
Attending: INTERNAL MEDICINE
Payer: COMMERCIAL

## 2020-10-15 PROCEDURE — 77334 RADIATION TREATMENT AID(S): CPT | Performed by: INTERNAL MEDICINE

## 2020-10-15 PROCEDURE — 77290 THER RAD SIMULAJ FIELD CPLX: CPT | Performed by: INTERNAL MEDICINE

## 2020-10-16 PROCEDURE — 77470 SPECIAL RADIATION TREATMENT: CPT | Performed by: INTERNAL MEDICINE

## 2020-10-21 PROCEDURE — 77293 RESPIRATOR MOTION MGMT SIMUL: CPT | Performed by: INTERNAL MEDICINE

## 2020-10-21 PROCEDURE — 77300 RADIATION THERAPY DOSE PLAN: CPT | Performed by: INTERNAL MEDICINE

## 2020-10-21 PROCEDURE — 77334 RADIATION TREATMENT AID(S): CPT | Performed by: INTERNAL MEDICINE

## 2020-10-21 PROCEDURE — 77295 3-D RADIOTHERAPY PLAN: CPT | Performed by: INTERNAL MEDICINE

## 2020-10-23 ENCOUNTER — APPOINTMENT (OUTPATIENT)
Dept: LAB | Facility: HOSPITAL | Age: 65
End: 2020-10-23
Attending: INTERNAL MEDICINE
Payer: COMMERCIAL

## 2020-10-23 DIAGNOSIS — C77.3 BREAST CANCER METASTASIZED TO AXILLARY LYMPH NODE, RIGHT (HCC): ICD-10-CM

## 2020-10-23 DIAGNOSIS — C50.911 BREAST CANCER METASTASIZED TO AXILLARY LYMPH NODE, RIGHT (HCC): ICD-10-CM

## 2020-10-26 ENCOUNTER — HOSPITAL ENCOUNTER (OUTPATIENT)
Dept: RADIATION ONCOLOGY | Facility: HOSPITAL | Age: 65
Discharge: HOME | End: 2020-10-26
Attending: INTERNAL MEDICINE
Payer: COMMERCIAL

## 2020-10-26 PROCEDURE — 77280 THER RAD SIMULAJ FIELD SMPL: CPT | Performed by: INTERNAL MEDICINE

## 2020-10-27 PROCEDURE — 77387 GUIDANCE FOR RADJ TX DLVR: CPT | Performed by: INTERNAL MEDICINE

## 2020-10-27 PROCEDURE — 77332 RADIATION TREATMENT AID(S): CPT | Performed by: INTERNAL MEDICINE

## 2020-10-27 PROCEDURE — 77412 RADIATION TX DELIVERY LVL 3: CPT | Performed by: INTERNAL MEDICINE

## 2020-10-28 PROCEDURE — 77387 GUIDANCE FOR RADJ TX DLVR: CPT | Performed by: INTERNAL MEDICINE

## 2020-10-28 PROCEDURE — 77412 RADIATION TX DELIVERY LVL 3: CPT | Performed by: INTERNAL MEDICINE

## 2020-10-29 PROCEDURE — 77412 RADIATION TX DELIVERY LVL 3: CPT | Performed by: INTERNAL MEDICINE

## 2020-10-29 PROCEDURE — 77387 GUIDANCE FOR RADJ TX DLVR: CPT | Performed by: INTERNAL MEDICINE

## 2020-10-29 NOTE — PROGRESS NOTES
[facility] Radiation Treatment Management Note 1-5    Patient:  Kamaljit Collado  Age:  63year old  Visit Diagnosis:  right breast IDC, grade 3, ER+, CO-, HER2+, Ki-67 high, cT2 N1 M0, overall stage IIB; ypT0 N0 (0/9)  Primary Rad

## 2020-10-30 ENCOUNTER — HOSPITAL ENCOUNTER (OUTPATIENT)
Dept: RADIATION ONCOLOGY | Facility: HOSPITAL | Age: 65
Discharge: HOME | End: 2020-10-30
Attending: INTERNAL MEDICINE
Payer: COMMERCIAL

## 2020-10-30 ENCOUNTER — OFFICE VISIT (OUTPATIENT)
Dept: HEMATOLOGY/ONCOLOGY | Facility: HOSPITAL | Age: 65
End: 2020-10-30
Attending: INTERNAL MEDICINE
Payer: COMMERCIAL

## 2020-10-30 VITALS
HEIGHT: 57.72 IN | HEART RATE: 86 BPM | TEMPERATURE: 99 F | DIASTOLIC BLOOD PRESSURE: 77 MMHG | WEIGHT: 93.38 LBS | OXYGEN SATURATION: 98 % | SYSTOLIC BLOOD PRESSURE: 128 MMHG | RESPIRATION RATE: 16 BRPM | BODY MASS INDEX: 19.6 KG/M2

## 2020-10-30 DIAGNOSIS — Z17.0 MALIGNANT NEOPLASM OF OVERLAPPING SITES OF RIGHT BREAST IN FEMALE, ESTROGEN RECEPTOR POSITIVE (HCC): Primary | ICD-10-CM

## 2020-10-30 DIAGNOSIS — C77.3 BREAST CANCER METASTASIZED TO AXILLARY LYMPH NODE, RIGHT (HCC): ICD-10-CM

## 2020-10-30 DIAGNOSIS — C50.911 BREAST CANCER METASTASIZED TO AXILLARY LYMPH NODE, RIGHT (HCC): Primary | ICD-10-CM

## 2020-10-30 DIAGNOSIS — C50.911 BREAST CANCER METASTASIZED TO AXILLARY LYMPH NODE, RIGHT (HCC): ICD-10-CM

## 2020-10-30 DIAGNOSIS — C50.811 MALIGNANT NEOPLASM OF OVERLAPPING SITES OF RIGHT BREAST IN FEMALE, ESTROGEN RECEPTOR POSITIVE (HCC): Primary | ICD-10-CM

## 2020-10-30 DIAGNOSIS — C77.3 BREAST CANCER METASTASIZED TO AXILLARY LYMPH NODE, RIGHT (HCC): Primary | ICD-10-CM

## 2020-10-30 PROCEDURE — 77387 GUIDANCE FOR RADJ TX DLVR: CPT | Performed by: INTERNAL MEDICINE

## 2020-10-30 PROCEDURE — 77336 RADIATION PHYSICS CONSULT: CPT | Performed by: INTERNAL MEDICINE

## 2020-10-30 PROCEDURE — 96417 CHEMO IV INFUS EACH ADDL SEQ: CPT

## 2020-10-30 PROCEDURE — 96413 CHEMO IV INFUSION 1 HR: CPT

## 2020-10-30 PROCEDURE — 77412 RADIATION TX DELIVERY LVL 3: CPT | Performed by: INTERNAL MEDICINE

## 2020-10-30 RX ORDER — SODIUM CHLORIDE 0.9 % (FLUSH) 0.9 %
10 SYRINGE (ML) INJECTION ONCE
Status: COMPLETED | OUTPATIENT
Start: 2020-10-30 | End: 2020-10-30

## 2020-10-30 RX ORDER — SODIUM CHLORIDE 0.9 % (FLUSH) 0.9 %
10 SYRINGE (ML) INJECTION ONCE
Status: CANCELLED | OUTPATIENT
Start: 2020-10-30

## 2020-10-30 RX ADMIN — SODIUM CHLORIDE 0.9 % (FLUSH) 10 ML: 0.9 % SYRINGE (ML) INJECTION at 13:10:00

## 2020-10-30 NOTE — PROGRESS NOTES
Education Record    Learner:  Patient    Disease / Lino mann  Barriers / Limitations:  None   Comments:    Method:  Brief focused   Comments:    General Topics:  Medication, Precautions, Procedure, Side effects and symptom management and Plan of

## 2020-11-01 ENCOUNTER — HOSPITAL ENCOUNTER (OUTPATIENT)
Dept: RADIATION ONCOLOGY | Facility: HOSPITAL | Age: 65
Discharge: HOME | End: 2020-11-01
Attending: INTERNAL MEDICINE
Payer: COMMERCIAL

## 2020-11-02 PROCEDURE — 77412 RADIATION TX DELIVERY LVL 3: CPT | Performed by: INTERNAL MEDICINE

## 2020-11-02 PROCEDURE — 77387 GUIDANCE FOR RADJ TX DLVR: CPT | Performed by: INTERNAL MEDICINE

## 2020-11-03 PROCEDURE — 77412 RADIATION TX DELIVERY LVL 3: CPT | Performed by: INTERNAL MEDICINE

## 2020-11-03 PROCEDURE — 77387 GUIDANCE FOR RADJ TX DLVR: CPT | Performed by: INTERNAL MEDICINE

## 2020-11-04 PROCEDURE — 77412 RADIATION TX DELIVERY LVL 3: CPT | Performed by: INTERNAL MEDICINE

## 2020-11-04 PROCEDURE — 77387 GUIDANCE FOR RADJ TX DLVR: CPT | Performed by: INTERNAL MEDICINE

## 2020-11-05 PROCEDURE — 77412 RADIATION TX DELIVERY LVL 3: CPT | Performed by: INTERNAL MEDICINE

## 2020-11-05 PROCEDURE — 77387 GUIDANCE FOR RADJ TX DLVR: CPT | Performed by: INTERNAL MEDICINE

## 2020-11-06 ENCOUNTER — HOSPITAL ENCOUNTER (OUTPATIENT)
Dept: RADIATION ONCOLOGY | Facility: HOSPITAL | Age: 65
Discharge: HOME | End: 2020-11-06
Attending: INTERNAL MEDICINE
Payer: COMMERCIAL

## 2020-11-06 VITALS
SYSTOLIC BLOOD PRESSURE: 150 MMHG | DIASTOLIC BLOOD PRESSURE: 88 MMHG | OXYGEN SATURATION: 100 % | RESPIRATION RATE: 18 BRPM | TEMPERATURE: 98 F | HEART RATE: 81 BPM

## 2020-11-06 DIAGNOSIS — C77.3 BREAST CANCER METASTASIZED TO AXILLARY LYMPH NODE, RIGHT (HCC): Primary | ICD-10-CM

## 2020-11-06 DIAGNOSIS — C50.911 BREAST CANCER METASTASIZED TO AXILLARY LYMPH NODE, RIGHT (HCC): Primary | ICD-10-CM

## 2020-11-06 PROCEDURE — 77336 RADIATION PHYSICS CONSULT: CPT | Performed by: INTERNAL MEDICINE

## 2020-11-06 PROCEDURE — 77387 GUIDANCE FOR RADJ TX DLVR: CPT | Performed by: INTERNAL MEDICINE

## 2020-11-06 PROCEDURE — 77331 SPECIAL RADIATION DOSIMETRY: CPT | Performed by: INTERNAL MEDICINE

## 2020-11-06 PROCEDURE — 77412 RADIATION TX DELIVERY LVL 3: CPT | Performed by: INTERNAL MEDICINE

## 2020-11-06 NOTE — PROGRESS NOTES
[facility] Radiation Treatment Management Note 6-10    Patient:  Karen Shoulders  Age:  63year old  Visit Diagnosis:  right breast IDC, grade 3, ER+, MT-, HER2+, Ki-67 high, cT2 N1 M0, overall stage IIB; ypT0 N0 (0/9)  Primary Ra

## 2020-11-09 PROCEDURE — 77387 GUIDANCE FOR RADJ TX DLVR: CPT | Performed by: INTERNAL MEDICINE

## 2020-11-09 PROCEDURE — 77412 RADIATION TX DELIVERY LVL 3: CPT | Performed by: INTERNAL MEDICINE

## 2020-11-10 PROCEDURE — 77387 GUIDANCE FOR RADJ TX DLVR: CPT | Performed by: INTERNAL MEDICINE

## 2020-11-10 PROCEDURE — 77412 RADIATION TX DELIVERY LVL 3: CPT | Performed by: INTERNAL MEDICINE

## 2020-11-11 PROCEDURE — 77387 GUIDANCE FOR RADJ TX DLVR: CPT | Performed by: INTERNAL MEDICINE

## 2020-11-11 PROCEDURE — 77412 RADIATION TX DELIVERY LVL 3: CPT | Performed by: INTERNAL MEDICINE

## 2020-11-12 ENCOUNTER — APPOINTMENT (OUTPATIENT)
Dept: HEMATOLOGY/ONCOLOGY | Facility: HOSPITAL | Age: 65
End: 2020-11-12
Attending: INTERNAL MEDICINE
Payer: COMMERCIAL

## 2020-11-12 PROCEDURE — 77387 GUIDANCE FOR RADJ TX DLVR: CPT | Performed by: INTERNAL MEDICINE

## 2020-11-12 PROCEDURE — 77412 RADIATION TX DELIVERY LVL 3: CPT | Performed by: INTERNAL MEDICINE

## 2020-11-13 ENCOUNTER — HOSPITAL ENCOUNTER (OUTPATIENT)
Dept: RADIATION ONCOLOGY | Facility: HOSPITAL | Age: 65
Discharge: HOME | End: 2020-11-13
Attending: INTERNAL MEDICINE
Payer: COMMERCIAL

## 2020-11-13 VITALS
TEMPERATURE: 98 F | OXYGEN SATURATION: 100 % | SYSTOLIC BLOOD PRESSURE: 135 MMHG | HEART RATE: 71 BPM | DIASTOLIC BLOOD PRESSURE: 74 MMHG | RESPIRATION RATE: 18 BRPM

## 2020-11-13 DIAGNOSIS — C77.3 BREAST CANCER METASTASIZED TO AXILLARY LYMPH NODE, RIGHT (HCC): Primary | ICD-10-CM

## 2020-11-13 DIAGNOSIS — C50.911 BREAST CANCER METASTASIZED TO AXILLARY LYMPH NODE, RIGHT (HCC): Primary | ICD-10-CM

## 2020-11-13 PROCEDURE — 77387 GUIDANCE FOR RADJ TX DLVR: CPT | Performed by: INTERNAL MEDICINE

## 2020-11-13 PROCEDURE — 77336 RADIATION PHYSICS CONSULT: CPT | Performed by: INTERNAL MEDICINE

## 2020-11-13 PROCEDURE — 77412 RADIATION TX DELIVERY LVL 3: CPT | Performed by: INTERNAL MEDICINE

## 2020-11-13 NOTE — PROGRESS NOTES
[facility] Radiation Treatment Management Note 11-15    Patient:  Go Frank  Age:  63year old  Visit Diagnosis:  right breast IDC, grade 3, ER+, HI-, HER2+, Ki-67 high, cT2 N1 M0, overall stage IIB; ypT0 N0 (0/9)  Primary R

## 2020-11-16 PROCEDURE — 77387 GUIDANCE FOR RADJ TX DLVR: CPT | Performed by: INTERNAL MEDICINE

## 2020-11-16 PROCEDURE — 77412 RADIATION TX DELIVERY LVL 3: CPT | Performed by: INTERNAL MEDICINE

## 2020-11-17 PROCEDURE — 77412 RADIATION TX DELIVERY LVL 3: CPT | Performed by: INTERNAL MEDICINE

## 2020-11-17 PROCEDURE — 77387 GUIDANCE FOR RADJ TX DLVR: CPT | Performed by: INTERNAL MEDICINE

## 2020-11-18 PROCEDURE — 77387 GUIDANCE FOR RADJ TX DLVR: CPT | Performed by: INTERNAL MEDICINE

## 2020-11-18 PROCEDURE — 77412 RADIATION TX DELIVERY LVL 3: CPT | Performed by: INTERNAL MEDICINE

## 2020-11-19 ENCOUNTER — OFFICE VISIT (OUTPATIENT)
Dept: HEMATOLOGY/ONCOLOGY | Facility: HOSPITAL | Age: 65
End: 2020-11-19
Attending: INTERNAL MEDICINE
Payer: COMMERCIAL

## 2020-11-19 DIAGNOSIS — Z17.0 MALIGNANT NEOPLASM OF OVERLAPPING SITES OF RIGHT BREAST IN FEMALE, ESTROGEN RECEPTOR POSITIVE (HCC): Primary | ICD-10-CM

## 2020-11-19 DIAGNOSIS — C77.3 BREAST CANCER METASTASIZED TO AXILLARY LYMPH NODE, RIGHT (HCC): ICD-10-CM

## 2020-11-19 DIAGNOSIS — Z78.0 ASYMPTOMATIC MENOPAUSE: ICD-10-CM

## 2020-11-19 DIAGNOSIS — M85.851 OSTEOPENIA OF NECKS OF BOTH FEMURS: ICD-10-CM

## 2020-11-19 DIAGNOSIS — M85.852 OSTEOPENIA OF NECKS OF BOTH FEMURS: ICD-10-CM

## 2020-11-19 DIAGNOSIS — C50.911 BREAST CANCER METASTASIZED TO AXILLARY LYMPH NODE, RIGHT (HCC): ICD-10-CM

## 2020-11-19 DIAGNOSIS — C50.811 MALIGNANT NEOPLASM OF OVERLAPPING SITES OF RIGHT BREAST IN FEMALE, ESTROGEN RECEPTOR POSITIVE (HCC): Primary | ICD-10-CM

## 2020-11-19 DIAGNOSIS — D63.0 ANEMIA COMPLICATING NEOPLASTIC DISEASE: ICD-10-CM

## 2020-11-19 DIAGNOSIS — Z79.811 LONG TERM CURRENT USE OF AROMATASE INHIBITOR: ICD-10-CM

## 2020-11-19 PROBLEM — M81.8 OSTEOPOROSIS DUE TO AROMATASE INHIBITOR: Status: ACTIVE | Noted: 2020-11-19

## 2020-11-19 PROBLEM — T38.6X5A OSTEOPOROSIS DUE TO AROMATASE INHIBITOR: Status: ACTIVE | Noted: 2020-11-19

## 2020-11-19 PROCEDURE — 96417 CHEMO IV INFUS EACH ADDL SEQ: CPT

## 2020-11-19 PROCEDURE — 82728 ASSAY OF FERRITIN: CPT

## 2020-11-19 PROCEDURE — 82306 VITAMIN D 25 HYDROXY: CPT

## 2020-11-19 PROCEDURE — 82607 VITAMIN B-12: CPT

## 2020-11-19 PROCEDURE — 80053 COMPREHEN METABOLIC PANEL: CPT

## 2020-11-19 PROCEDURE — 77387 GUIDANCE FOR RADJ TX DLVR: CPT | Performed by: INTERNAL MEDICINE

## 2020-11-19 PROCEDURE — 83550 IRON BINDING TEST: CPT

## 2020-11-19 PROCEDURE — 85025 COMPLETE CBC W/AUTO DIFF WBC: CPT

## 2020-11-19 PROCEDURE — 77412 RADIATION TX DELIVERY LVL 3: CPT | Performed by: INTERNAL MEDICINE

## 2020-11-19 PROCEDURE — 83540 ASSAY OF IRON: CPT

## 2020-11-19 PROCEDURE — 99214 OFFICE O/P EST MOD 30 MIN: CPT | Performed by: INTERNAL MEDICINE

## 2020-11-19 PROCEDURE — 96413 CHEMO IV INFUSION 1 HR: CPT

## 2020-11-19 RX ORDER — ZOLEDRONIC ACID 5 MG/100ML
5 INJECTION, SOLUTION INTRAVENOUS ONCE
Status: CANCELLED | OUTPATIENT
Start: 2020-11-19

## 2020-11-19 NOTE — PROGRESS NOTES
MD follow up, infusion today Omayra/ perjeta. Pt is feeling well. She reports fatigue, but no other side effects.    Education Record    Learner:  Patient    Disease / [facility]    Barriers / Limitations:  None   Comments:    Method:  Dis

## 2020-11-19 NOTE — PROGRESS NOTES
Cancer Center Progress Note    Problem List:      Patient Active Problem List:     Malignant neoplasm of overlapping sites of right breast in female, estrogen receptor positive ([facility] [address]     Breast cancer metastasized to axillary lymph node, right ([facility] [address]     Br hematuria  Neurological: Negative for headaches, dizziness, speech problems, gait problems and focal weakness. Psychiatric: The patient's mood was calm and appropriate for this visit. The pertinent positives and negatives were described.  All other system No wheezes. Cardiovascular: Regular rate and rhythm. No murmurs. Gastrointestinal: Soft, non tender with good bowel sounds. Musculoskeletal: No edema. No calf tenderness. Neurological: Grossly intact without focal motor or sensory deficit.   Skin: No baron controls:  88      Change from prior hip examination:  N/A               Left FEMORAL NECK ANALYSIS RESULTS:        Bone mineral density (BMD) (g/cm2):  0.633      Femoral neck T-Score:  -1.9      % young normals:  75      % age matched controls:  80 recommended starting once yearly Reclast for her high risk due to the aromatase inhibitor treatment and her decreased femoral neck bone density. We would repeat the bone density in 2 years.       Surinder Yusuf MD

## 2020-11-20 ENCOUNTER — HOSPITAL ENCOUNTER (OUTPATIENT)
Dept: RADIATION ONCOLOGY | Facility: HOSPITAL | Age: 65
Discharge: HOME | End: 2020-11-20
Attending: INTERNAL MEDICINE
Payer: COMMERCIAL

## 2020-11-20 VITALS
SYSTOLIC BLOOD PRESSURE: 151 MMHG | HEART RATE: 60 BPM | TEMPERATURE: 99 F | OXYGEN SATURATION: 100 % | RESPIRATION RATE: 16 BRPM | DIASTOLIC BLOOD PRESSURE: 85 MMHG

## 2020-11-20 DIAGNOSIS — C50.911 BREAST CANCER METASTASIZED TO AXILLARY LYMPH NODE, RIGHT (HCC): Primary | ICD-10-CM

## 2020-11-20 DIAGNOSIS — C77.3 BREAST CANCER METASTASIZED TO AXILLARY LYMPH NODE, RIGHT (HCC): Primary | ICD-10-CM

## 2020-11-20 PROCEDURE — 77336 RADIATION PHYSICS CONSULT: CPT | Performed by: INTERNAL MEDICINE

## 2020-11-20 PROCEDURE — 77412 RADIATION TX DELIVERY LVL 3: CPT | Performed by: INTERNAL MEDICINE

## 2020-11-20 PROCEDURE — 77387 GUIDANCE FOR RADJ TX DLVR: CPT | Performed by: INTERNAL MEDICINE

## 2020-11-20 NOTE — PROGRESS NOTES
[facility] Radiation Treatment Management Note 16-20    Patient:  Rey Doyle  Age:  63year old  Visit Diagnosis:  right breast IDC, grade 3, ER+, [place]-, HER2+, Ki-67 high, cT2 N1 M0, overall stage IIB; ypT0 N0 (0/9)  Primary R

## 2020-11-22 PROCEDURE — 77412 RADIATION TX DELIVERY LVL 3: CPT | Performed by: INTERNAL MEDICINE

## 2020-11-22 PROCEDURE — 77387 GUIDANCE FOR RADJ TX DLVR: CPT | Performed by: INTERNAL MEDICINE

## 2020-11-23 ENCOUNTER — TELEPHONE (OUTPATIENT)
Dept: HEMATOLOGY/ONCOLOGY | Facility: HOSPITAL | Age: 65
End: 2020-11-23

## 2020-11-23 PROCEDURE — 77412 RADIATION TX DELIVERY LVL 3: CPT | Performed by: INTERNAL MEDICINE

## 2020-11-23 PROCEDURE — 77387 GUIDANCE FOR RADJ TX DLVR: CPT | Performed by: INTERNAL MEDICINE

## 2020-11-23 RX ORDER — ERGOCALCIFEROL 1.25 MG/1
50000 CAPSULE ORAL WEEKLY
Qty: 12 CAPSULE | Refills: 0 | Status: SHIPPED | OUTPATIENT
Start: 2020-11-23 | End: 2021-03-03

## 2020-11-24 ENCOUNTER — HOSPITAL ENCOUNTER (OUTPATIENT)
Dept: RADIATION ONCOLOGY | Facility: HOSPITAL | Age: 65
Discharge: HOME | End: 2020-11-24
Attending: INTERNAL MEDICINE
Payer: COMMERCIAL

## 2020-11-24 VITALS
OXYGEN SATURATION: 98 % | TEMPERATURE: 98 F | RESPIRATION RATE: 18 BRPM | HEART RATE: 65 BPM | DIASTOLIC BLOOD PRESSURE: 68 MMHG | SYSTOLIC BLOOD PRESSURE: 140 MMHG

## 2020-11-24 DIAGNOSIS — C77.3 BREAST CANCER METASTASIZED TO AXILLARY LYMPH NODE, RIGHT (HCC): Primary | ICD-10-CM

## 2020-11-24 DIAGNOSIS — C50.911 BREAST CANCER METASTASIZED TO AXILLARY LYMPH NODE, RIGHT (HCC): Primary | ICD-10-CM

## 2020-11-24 PROCEDURE — 77412 RADIATION TX DELIVERY LVL 3: CPT | Performed by: INTERNAL MEDICINE

## 2020-11-24 PROCEDURE — 77387 GUIDANCE FOR RADJ TX DLVR: CPT | Performed by: INTERNAL MEDICINE

## 2020-11-24 NOTE — PROGRESS NOTES
[facility] Radiation Treatment Management Note 16-20    Patient:  Augustin Angel  Age:  63year old  Visit Diagnosis:  right breast IDC, grade 3, ER+, ND-, HER2+, Ki-67 high, cT2 N1 M0, overall stage IIB; ypT0 N0 (0/9)  Primary R

## 2020-11-25 PROCEDURE — 77412 RADIATION TX DELIVERY LVL 3: CPT | Performed by: INTERNAL MEDICINE

## 2020-11-25 PROCEDURE — 77387 GUIDANCE FOR RADJ TX DLVR: CPT | Performed by: INTERNAL MEDICINE

## 2020-11-27 PROCEDURE — 77336 RADIATION PHYSICS CONSULT: CPT | Performed by: INTERNAL MEDICINE

## 2020-11-30 PROCEDURE — 77412 RADIATION TX DELIVERY LVL 3: CPT | Performed by: INTERNAL MEDICINE

## 2020-11-30 PROCEDURE — 77387 GUIDANCE FOR RADJ TX DLVR: CPT | Performed by: INTERNAL MEDICINE

## 2020-12-01 ENCOUNTER — DOCUMENTATION ONLY (OUTPATIENT)
Dept: RADIATION ONCOLOGY | Facility: HOSPITAL | Age: 65
End: 2020-12-01

## 2020-12-01 ENCOUNTER — HOSPITAL ENCOUNTER (OUTPATIENT)
Dept: RADIATION ONCOLOGY | Facility: HOSPITAL | Age: 65
Discharge: HOME | End: 2020-12-01
Attending: INTERNAL MEDICINE
Payer: COMMERCIAL

## 2020-12-01 PROCEDURE — 77412 RADIATION TX DELIVERY LVL 3: CPT | Performed by: INTERNAL MEDICINE

## 2020-12-01 PROCEDURE — 77387 GUIDANCE FOR RADJ TX DLVR: CPT | Performed by: INTERNAL MEDICINE

## 2020-12-08 ENCOUNTER — OFFICE VISIT (OUTPATIENT)
Dept: SURGERY | Facility: CLINIC | Age: 65
End: 2020-12-08
Payer: COMMERCIAL

## 2020-12-08 VITALS
TEMPERATURE: 97 F | DIASTOLIC BLOOD PRESSURE: 78 MMHG | OXYGEN SATURATION: 96 % | SYSTOLIC BLOOD PRESSURE: 132 MMHG | HEART RATE: 72 BPM

## 2020-12-08 DIAGNOSIS — Z85.3 HISTORY OF RIGHT BREAST CANCER: Primary | ICD-10-CM

## 2020-12-08 DIAGNOSIS — C77.3 BREAST CANCER METASTASIZED TO AXILLARY LYMPH NODE, RIGHT (HCC): ICD-10-CM

## 2020-12-08 DIAGNOSIS — C50.911 BREAST CANCER METASTASIZED TO AXILLARY LYMPH NODE, RIGHT (HCC): ICD-10-CM

## 2020-12-08 DIAGNOSIS — Z90.11 S/P RIGHT MASTECTOMY: ICD-10-CM

## 2020-12-08 PROCEDURE — 3078F DIAST BP <80 MM HG: CPT | Performed by: SURGERY

## 2020-12-08 PROCEDURE — 99213 OFFICE O/P EST LOW 20 MIN: CPT | Performed by: SURGERY

## 2020-12-08 PROCEDURE — 3075F SYST BP GE 130 - 139MM HG: CPT | Performed by: SURGERY

## 2020-12-08 NOTE — PROGRESS NOTES
Follow Up Visit Note       Active Problems      1. History of right breast cancer    2. Breast cancer metastasized to axillary lymph node, right ([address]    3. S/P right mastectomy          Chief Complaint   Patient presents with:   Follow - Up: 3 month hours as needed for Nausea., Disp: 30 tablet, Rfl: 3     Review of Systems  The Review of Systems has been reviewed by me during today. Review of Systems   Constitutional: Negative for diaphoresis, fever and unexpected weight change.    HENT: Negative for position. The breast parenchyma is the above referenced H&P was reviewed on @TD@ and no significant changes have occurred in the patient's condition.   The potential treatment options as well as risks and benefits of surgery were reviewed with the patien upper outer quadrant 10 o'clock position.    These areas are suspicious for malignancy  Multiple nonenlarged lymph nodes are noted in the right axilla  No abnormalities left breast  Ultrasound core biopsy right breast 10:00 retroareolar position-8 mm grade are no open sores noted. Sheila is due for contralateral left mammogram in January. She has follow-up appointment with Dr. Perez Suazo in January as well. She will review mammogram with Dr. Perez Suazo.     Ja Jessica will see me for follow-up visit in 6 months    Assessm

## 2020-12-10 ENCOUNTER — OFFICE VISIT (OUTPATIENT)
Dept: HEMATOLOGY/ONCOLOGY | Facility: HOSPITAL | Age: 65
End: 2020-12-10
Attending: INTERNAL MEDICINE
Payer: COMMERCIAL

## 2020-12-10 VITALS
BODY MASS INDEX: 19.88 KG/M2 | RESPIRATION RATE: 16 BRPM | TEMPERATURE: 97 F | HEIGHT: 57.72 IN | SYSTOLIC BLOOD PRESSURE: 141 MMHG | OXYGEN SATURATION: 99 % | WEIGHT: 94.69 LBS | DIASTOLIC BLOOD PRESSURE: 88 MMHG | HEART RATE: 74 BPM

## 2020-12-10 DIAGNOSIS — T38.6X5A OSTEOPOROSIS DUE TO AROMATASE INHIBITOR: ICD-10-CM

## 2020-12-10 DIAGNOSIS — C77.3 BREAST CANCER METASTASIZED TO AXILLARY LYMPH NODE, RIGHT (HCC): ICD-10-CM

## 2020-12-10 DIAGNOSIS — Z17.0 MALIGNANT NEOPLASM OF OVERLAPPING SITES OF RIGHT BREAST IN FEMALE, ESTROGEN RECEPTOR POSITIVE (HCC): Primary | ICD-10-CM

## 2020-12-10 DIAGNOSIS — M85.852 OSTEOPENIA OF NECKS OF BOTH FEMURS: ICD-10-CM

## 2020-12-10 DIAGNOSIS — M81.8 OSTEOPOROSIS DUE TO AROMATASE INHIBITOR: ICD-10-CM

## 2020-12-10 DIAGNOSIS — M85.851 OSTEOPENIA OF NECKS OF BOTH FEMURS: ICD-10-CM

## 2020-12-10 DIAGNOSIS — C50.911 BREAST CANCER METASTASIZED TO AXILLARY LYMPH NODE, RIGHT (HCC): ICD-10-CM

## 2020-12-10 DIAGNOSIS — C50.811 MALIGNANT NEOPLASM OF OVERLAPPING SITES OF RIGHT BREAST IN FEMALE, ESTROGEN RECEPTOR POSITIVE (HCC): Primary | ICD-10-CM

## 2020-12-10 PROCEDURE — 80053 COMPREHEN METABOLIC PANEL: CPT

## 2020-12-10 PROCEDURE — 96417 CHEMO IV INFUS EACH ADDL SEQ: CPT

## 2020-12-10 PROCEDURE — 96413 CHEMO IV INFUSION 1 HR: CPT

## 2020-12-10 PROCEDURE — 85025 COMPLETE CBC W/AUTO DIFF WBC: CPT

## 2020-12-10 PROCEDURE — 96367 TX/PROPH/DG ADDL SEQ IV INF: CPT

## 2020-12-10 RX ORDER — ZOLEDRONIC ACID 5 MG/100ML
5 INJECTION, SOLUTION INTRAVENOUS ONCE
Status: CANCELLED | OUTPATIENT
Start: 2020-12-10

## 2020-12-10 RX ORDER — ANASTROZOLE 1 MG/1
TABLET ORAL
Qty: 90 TABLET | Refills: 3 | Status: SHIPPED | OUTPATIENT
Start: 2020-12-10 | End: 2021-11-30

## 2020-12-10 RX ORDER — ZOLEDRONIC ACID 5 MG/100ML
5 INJECTION, SOLUTION INTRAVENOUS ONCE
Status: COMPLETED | OUTPATIENT
Start: 2020-12-10 | End: 2020-12-10

## 2020-12-10 RX ORDER — SODIUM CHLORIDE 0.9 % (FLUSH) 0.9 %
10 SYRINGE (ML) INJECTION ONCE
Status: CANCELLED | OUTPATIENT
Start: 2020-12-10

## 2020-12-10 RX ORDER — SODIUM CHLORIDE 0.9 % (FLUSH) 0.9 %
10 SYRINGE (ML) INJECTION ONCE
Status: COMPLETED | OUTPATIENT
Start: 2020-12-10 | End: 2020-12-10

## 2020-12-10 RX ADMIN — SODIUM CHLORIDE 0.9 % (FLUSH) 10 ML: 0.9 % SYRINGE (ML) INJECTION at 13:00:00

## 2020-12-10 RX ADMIN — ZOLEDRONIC ACID 5 MG: 5 INJECTION, SOLUTION INTRAVENOUS at 12:37:00

## 2020-12-10 NOTE — PROGRESS NOTES
Pt here for C13D1 Perjeta/Kanjinti.   Arrives Ambulating independently, accompanied by Self           Patient reports possible pregnancy since last therapy cycle: No    Modifications in dose or schedule: no     Frequency of blood return and site check throu

## 2020-12-13 NOTE — PROGRESS NOTES
Radiation Oncology Treatment Summary    Diagnosis: right breast IDC, grade 3, ER+, PA-, HER2+, Ki-67 high, cT2 N1 M0, overall stage IIB; ypT0 N0 (0/9)    Site: right chest wall and draining lymphatics     Dose: 5000 cGy in 25 fractions     Treatment Start

## 2020-12-14 ENCOUNTER — HOSPITAL ENCOUNTER (OUTPATIENT)
Dept: CV DIAGNOSTICS | Facility: HOSPITAL | Age: 65
Discharge: HOME | End: 2020-12-14
Attending: INTERNAL MEDICINE
Payer: COMMERCIAL

## 2020-12-14 DIAGNOSIS — C50.819: ICD-10-CM

## 2020-12-14 PROCEDURE — 93356 MYOCRD STRAIN IMG SPCKL TRCK: CPT | Performed by: INTERNAL MEDICINE

## 2020-12-14 PROCEDURE — 93307 TTE W/O DOPPLER COMPLETE: CPT | Performed by: INTERNAL MEDICINE

## 2020-12-31 ENCOUNTER — OFFICE VISIT (OUTPATIENT)
Dept: HEMATOLOGY/ONCOLOGY | Facility: HOSPITAL | Age: 65
End: 2020-12-31
Attending: INTERNAL MEDICINE
Payer: COMMERCIAL

## 2020-12-31 VITALS
OXYGEN SATURATION: 98 % | RESPIRATION RATE: 16 BRPM | WEIGHT: 95 LBS | HEART RATE: 78 BPM | BODY MASS INDEX: 19.94 KG/M2 | TEMPERATURE: 97 F | DIASTOLIC BLOOD PRESSURE: 79 MMHG | HEIGHT: 57.72 IN | SYSTOLIC BLOOD PRESSURE: 136 MMHG

## 2020-12-31 DIAGNOSIS — C50.911 BREAST CANCER METASTASIZED TO AXILLARY LYMPH NODE, RIGHT (HCC): ICD-10-CM

## 2020-12-31 DIAGNOSIS — Z17.0 MALIGNANT NEOPLASM OF OVERLAPPING SITES OF RIGHT BREAST IN FEMALE, ESTROGEN RECEPTOR POSITIVE (HCC): Primary | ICD-10-CM

## 2020-12-31 DIAGNOSIS — C50.811 MALIGNANT NEOPLASM OF OVERLAPPING SITES OF RIGHT BREAST IN FEMALE, ESTROGEN RECEPTOR POSITIVE (HCC): Primary | ICD-10-CM

## 2020-12-31 DIAGNOSIS — C77.3 BREAST CANCER METASTASIZED TO AXILLARY LYMPH NODE, RIGHT (HCC): ICD-10-CM

## 2020-12-31 PROCEDURE — 96413 CHEMO IV INFUSION 1 HR: CPT

## 2020-12-31 PROCEDURE — 96417 CHEMO IV INFUS EACH ADDL SEQ: CPT

## 2020-12-31 NOTE — PROGRESS NOTES
Pt here for C 14 D 1 kanjinti/perjeta.   Arrives Ambulating independently, accompanied by Self           Patient reports possible pregnancy since last therapy cycle: Not Applicable    Modifications in dose or schedule: No     Frequency of blood return and s

## 2021-01-11 ENCOUNTER — HOSPITAL ENCOUNTER (OUTPATIENT)
Dept: MAMMOGRAPHY | Facility: HOSPITAL | Age: 66
Discharge: HOME | End: 2021-01-11
Attending: SURGERY
Payer: COMMERCIAL

## 2021-01-11 DIAGNOSIS — Z85.3 HISTORY OF RIGHT BREAST CANCER: ICD-10-CM

## 2021-01-11 PROCEDURE — 77061 BREAST TOMOSYNTHESIS UNI: CPT | Performed by: SURGERY

## 2021-01-11 PROCEDURE — 77065 DX MAMMO INCL CAD UNI: CPT | Performed by: SURGERY

## 2021-01-15 ENCOUNTER — APPOINTMENT (OUTPATIENT)
Dept: RADIATION ONCOLOGY | Facility: HOSPITAL | Age: 66
End: 2021-01-15
Attending: INTERNAL MEDICINE
Payer: COMMERCIAL

## 2021-01-19 ENCOUNTER — HOSPITAL ENCOUNTER (OUTPATIENT)
Dept: RADIATION ONCOLOGY | Facility: HOSPITAL | Age: 66
Discharge: HOME | End: 2021-01-19
Attending: INTERNAL MEDICINE
Payer: COMMERCIAL

## 2021-01-19 VITALS
DIASTOLIC BLOOD PRESSURE: 71 MMHG | SYSTOLIC BLOOD PRESSURE: 149 MMHG | RESPIRATION RATE: 18 BRPM | OXYGEN SATURATION: 100 % | HEART RATE: 72 BPM | TEMPERATURE: 99 F

## 2021-01-19 DIAGNOSIS — C50.911 BREAST CANCER METASTASIZED TO AXILLARY LYMPH NODE, RIGHT (HCC): Primary | ICD-10-CM

## 2021-01-19 DIAGNOSIS — C77.3 BREAST CANCER METASTASIZED TO AXILLARY LYMPH NODE, RIGHT (HCC): Primary | ICD-10-CM

## 2021-01-19 PROCEDURE — 99213 OFFICE O/P EST LOW 20 MIN: CPT

## 2021-01-19 NOTE — PATIENT INSTRUCTIONS
- CALL [phone] FOR A FOLLOW-UP WITH DR. BARGER IN 6 MONTHS  - FOLLOW-UP WITH DR BRAN [facility] as scheduled  - CALL IF YOU HAVE ANY QUESTIONS/CONCERNS REGARDING RADIATION THERAPY: 21

## 2021-01-19 NOTE — PROGRESS NOTES
Nursing Follow-Up Note    Patient: Rick Quesada  YOB: 1955  Age: 63year old  Radiation Oncologist: Dr. Yesica Eaton  Referring Physician: Александр Michaud  Chief Complaint: Patient presents with:   Follow - Up    Date: 1/19/2021    Tox

## 2021-01-20 NOTE — PROGRESS NOTES
[address] [facility] Oncology Follow-up    Patient Name: Leodan Tsai   MRN: [id number]  Referring Physician:  Mg Rothman MD; Noreen Chen MD     Diagnosis: right breast IDC, grade 3, ER+, ME-, HER2+, Ki-67 high, cT2 N1 M0, overall s cyanosis or edema  Neuro: CN II-XII grossly intact    Breast Exam  -Right breast: surgically absent, mild residual hyperpigmentation, lateral CW edema . Right axilla without edema, masses, or LAD.    -Left breast: normal contours, no skin changes, no dimpli

## 2021-01-20 NOTE — ADDENDUM NOTE
Encounter addended by: Faith Hodge MD on: 1/20/2021 9:08 AM   Actions taken: Charge Capture section accepted

## 2021-01-21 ENCOUNTER — OFFICE VISIT (OUTPATIENT)
Dept: HEMATOLOGY/ONCOLOGY | Facility: HOSPITAL | Age: 66
End: 2021-01-21
Attending: INTERNAL MEDICINE
Payer: COMMERCIAL

## 2021-01-21 VITALS
TEMPERATURE: 99 F | SYSTOLIC BLOOD PRESSURE: 147 MMHG | HEIGHT: 57.72 IN | HEART RATE: 75 BPM | OXYGEN SATURATION: 99 % | RESPIRATION RATE: 16 BRPM | BODY MASS INDEX: 19.86 KG/M2 | DIASTOLIC BLOOD PRESSURE: 82 MMHG | WEIGHT: 94.63 LBS

## 2021-01-21 DIAGNOSIS — M85.851 OSTEOPENIA OF NECKS OF BOTH FEMURS: ICD-10-CM

## 2021-01-21 DIAGNOSIS — T38.6X5A OSTEOPOROSIS DUE TO AROMATASE INHIBITOR: ICD-10-CM

## 2021-01-21 DIAGNOSIS — M85.852 OSTEOPENIA OF NECKS OF BOTH FEMURS: ICD-10-CM

## 2021-01-21 DIAGNOSIS — C77.3 BREAST CANCER METASTASIZED TO AXILLARY LYMPH NODE, RIGHT (HCC): ICD-10-CM

## 2021-01-21 DIAGNOSIS — E55.9 VITAMIN D DEFICIENCY: ICD-10-CM

## 2021-01-21 DIAGNOSIS — C50.811 MALIGNANT NEOPLASM OF OVERLAPPING SITES OF RIGHT BREAST IN FEMALE, ESTROGEN RECEPTOR POSITIVE (HCC): Primary | ICD-10-CM

## 2021-01-21 DIAGNOSIS — C50.911 BREAST CANCER METASTASIZED TO AXILLARY LYMPH NODE, RIGHT (HCC): ICD-10-CM

## 2021-01-21 DIAGNOSIS — Z17.0 MALIGNANT NEOPLASM OF OVERLAPPING SITES OF RIGHT BREAST IN FEMALE, ESTROGEN RECEPTOR POSITIVE (HCC): Primary | ICD-10-CM

## 2021-01-21 DIAGNOSIS — D63.0 ANEMIA COMPLICATING NEOPLASTIC DISEASE: ICD-10-CM

## 2021-01-21 DIAGNOSIS — M81.8 OSTEOPOROSIS DUE TO AROMATASE INHIBITOR: ICD-10-CM

## 2021-01-21 LAB
ALBUMIN SERPL-MCNC: 3.8 G/DL (ref 3.4–5)
ALBUMIN/GLOB SERPL: 1.3 {RATIO} (ref 1–2)
ALP LIVER SERPL-CCNC: 76 U/L
ALT SERPL-CCNC: 19 U/L
ANION GAP SERPL CALC-SCNC: 3 MMOL/L (ref 0–18)
AST SERPL-CCNC: 8 U/L (ref 15–37)
BASOPHILS # BLD AUTO: 0.02 X10(3) UL (ref 0–0.2)
BASOPHILS NFR BLD AUTO: 0.4 %
BILIRUB SERPL-MCNC: 0.3 MG/DL (ref 0.1–2)
BUN BLD-MCNC: 15 MG/DL (ref 7–18)
BUN/CREAT SERPL: 16.3 (ref 10–20)
CALCIUM BLD-MCNC: 9.2 MG/DL (ref 8.5–10.1)
CHLORIDE SERPL-SCNC: 107 MMOL/L (ref 98–112)
CO2 SERPL-SCNC: 31 MMOL/L (ref 21–32)
CREAT BLD-MCNC: 0.92 MG/DL
DEPRECATED RDW RBC AUTO: 47.1 FL (ref 35.1–46.3)
EOSINOPHIL # BLD AUTO: 0.07 X10(3) UL (ref 0–0.7)
EOSINOPHIL NFR BLD AUTO: 1.4 %
ERYTHROCYTE [DISTWIDTH] IN BLOOD BY AUTOMATED COUNT: 13.8 % (ref 11–15)
GLOBULIN PLAS-MCNC: 2.9 G/DL (ref 2.8–4.4)
GLUCOSE BLD-MCNC: 83 MG/DL (ref 70–99)
HCT VFR BLD AUTO: 33.1 %
HGB BLD-MCNC: 10.5 G/DL
IMM GRANULOCYTES # BLD AUTO: 0.01 X10(3) UL (ref 0–1)
IMM GRANULOCYTES NFR BLD: 0.2 %
LYMPHOCYTES # BLD AUTO: 0.96 X10(3) UL (ref 1–4)
LYMPHOCYTES NFR BLD AUTO: 19.3 %
M PROTEIN MFR SERPL ELPH: 6.7 G/DL (ref 6.4–8.2)
MCH RBC QN AUTO: 29.3 PG (ref 26–34)
MCHC RBC AUTO-ENTMCNC: 31.7 G/DL (ref 31–37)
MCV RBC AUTO: 92.5 FL
MONOCYTES # BLD AUTO: 0.45 X10(3) UL (ref 0.1–1)
MONOCYTES NFR BLD AUTO: 9.1 %
NEUTROPHILS # BLD AUTO: 3.46 X10 (3) UL (ref 1.5–7.7)
NEUTROPHILS # BLD AUTO: 3.46 X10(3) UL (ref 1.5–7.7)
NEUTROPHILS NFR BLD AUTO: 69.6 %
OSMOLALITY SERPL CALC.SUM OF ELEC: 292 MOSM/KG (ref 275–295)
PATIENT FASTING Y/N/NP: NO
PLATELET # BLD AUTO: 169 10(3)UL (ref 150–450)
POTASSIUM SERPL-SCNC: 3.8 MMOL/L (ref 3.5–5.1)
RBC # BLD AUTO: 3.58 X10(6)UL
SODIUM SERPL-SCNC: 141 MMOL/L (ref 136–145)
WBC # BLD AUTO: 5 X10(3) UL (ref 4–11)

## 2021-01-21 PROCEDURE — 96413 CHEMO IV INFUSION 1 HR: CPT

## 2021-01-21 PROCEDURE — 85025 COMPLETE CBC W/AUTO DIFF WBC: CPT

## 2021-01-21 PROCEDURE — 80053 COMPREHEN METABOLIC PANEL: CPT

## 2021-01-21 PROCEDURE — 96417 CHEMO IV INFUS EACH ADDL SEQ: CPT

## 2021-01-21 PROCEDURE — 99214 OFFICE O/P EST MOD 30 MIN: CPT | Performed by: INTERNAL MEDICINE

## 2021-01-21 RX ORDER — SODIUM CHLORIDE 0.9 % (FLUSH) 0.9 %
10 SYRINGE (ML) INJECTION ONCE
Status: CANCELLED | OUTPATIENT
Start: 2021-01-21

## 2021-01-21 RX ORDER — SODIUM CHLORIDE 0.9 % (FLUSH) 0.9 %
10 SYRINGE (ML) INJECTION ONCE
Status: COMPLETED | OUTPATIENT
Start: 2021-01-21 | End: 2021-01-21

## 2021-01-21 RX ADMIN — SODIUM CHLORIDE 0.9 % (FLUSH) 10 ML: 0.9 % SYRINGE (ML) INJECTION at 14:25:00

## 2021-01-21 NOTE — PROGRESS NOTES
Pt here for C15D1 Perjeta/Kanjinti.   Arrives Ambulating independently, accompanied by Self           Patient reports possible pregnancy since last therapy cycle: No    Modifications in dose or schedule: No     Frequency of blood return and site check throu

## 2021-01-21 NOTE — PROGRESS NOTES
Cancer Center Progress Note    Problem List:      Patient Active Problem List:     Malignant neoplasm of overlapping sites of right breast in female, estrogen receptor positive ([facility] [address]     Breast cancer metastasized to axillary lymph node, right ([facility] [address]     Br systems were negative.     PMH/PSH:  Past Medical History:   Diagnosis Date   • Cancer of overlapping sites of right female breast [facility])     rt breast   • Personal history of antineoplastic chemotherapy     currently 8/27/20       Past Surgical History:   P auscultation and percussion. No rales. No wheezes. Cardiovascular: Regular rate and rhythm. No murmurs. Gastrointestinal: Soft, non tender with good bowel sounds. Musculoskeletal: No edema. No calf tenderness.   Neurological: Grossly intact without foca Technologist)  Asymptomatic menopause. Currently undergoing treatment for breast cancer.            LUMBAR SPINE ANALYSIS RESULTS:      Bone mineral density (BMD) (g/cm2):  0.994    Lumbar T-Score:  -0.5      % young normals:  95      % age matched control trastuzumab and pertuzumab. She will continue this. She has three more doses to complete the year of therapy. She has ERIKA at this visit. She continue with anastrozole 1 mg daily. She was comfortable with this plan.  I will now see her at six month intervals

## 2021-01-21 NOTE — PROGRESS NOTES
MD on treatment visit for breast cancer. Pt D1C15 herceptin/ perjeta. Completed XRT 12/2/2020. Recovering well. Fatigue is improving. Skin is healing.    Education Record    Learner:  Patient    Disease / Ardyth Leake of care    Barriers / Limitations

## 2021-02-11 ENCOUNTER — OFFICE VISIT (OUTPATIENT)
Dept: HEMATOLOGY/ONCOLOGY | Facility: HOSPITAL | Age: 66
End: 2021-02-11
Attending: INTERNAL MEDICINE
Payer: COMMERCIAL

## 2021-02-11 VITALS
HEART RATE: 82 BPM | DIASTOLIC BLOOD PRESSURE: 82 MMHG | BODY MASS INDEX: 19.52 KG/M2 | TEMPERATURE: 98 F | HEIGHT: 57.72 IN | SYSTOLIC BLOOD PRESSURE: 140 MMHG | OXYGEN SATURATION: 93 % | WEIGHT: 93 LBS | RESPIRATION RATE: 16 BRPM

## 2021-02-11 DIAGNOSIS — Z17.0 MALIGNANT NEOPLASM OF OVERLAPPING SITES OF RIGHT BREAST IN FEMALE, ESTROGEN RECEPTOR POSITIVE (HCC): Primary | ICD-10-CM

## 2021-02-11 DIAGNOSIS — C50.811 MALIGNANT NEOPLASM OF OVERLAPPING SITES OF RIGHT BREAST IN FEMALE, ESTROGEN RECEPTOR POSITIVE (HCC): Primary | ICD-10-CM

## 2021-02-11 DIAGNOSIS — C77.3 BREAST CANCER METASTASIZED TO AXILLARY LYMPH NODE, RIGHT (HCC): ICD-10-CM

## 2021-02-11 DIAGNOSIS — C50.911 BREAST CANCER METASTASIZED TO AXILLARY LYMPH NODE, RIGHT (HCC): ICD-10-CM

## 2021-02-11 PROCEDURE — 96417 CHEMO IV INFUS EACH ADDL SEQ: CPT

## 2021-02-11 PROCEDURE — 96413 CHEMO IV INFUSION 1 HR: CPT

## 2021-02-11 NOTE — PROGRESS NOTES
Pt here for C16D1.   Arrives Ambulating independently, accompanied by Self           Patient reports possible pregnancy since last therapy cycle: No    Modifications in dose or schedule: No     Frequency of blood return and site check throughout administrat

## 2021-03-03 RX ORDER — ERGOCALCIFEROL 1.25 MG/1
CAPSULE ORAL
Qty: 12 CAPSULE | Refills: 0 | Status: SHIPPED | OUTPATIENT
Start: 2021-03-03 | End: 2021-05-26

## 2021-03-04 ENCOUNTER — OFFICE VISIT (OUTPATIENT)
Dept: HEMATOLOGY/ONCOLOGY | Facility: HOSPITAL | Age: 66
End: 2021-03-04
Attending: INTERNAL MEDICINE
Payer: COMMERCIAL

## 2021-03-04 VITALS
WEIGHT: 93.5 LBS | TEMPERATURE: 99 F | RESPIRATION RATE: 18 BRPM | SYSTOLIC BLOOD PRESSURE: 144 MMHG | HEIGHT: 57.72 IN | DIASTOLIC BLOOD PRESSURE: 94 MMHG | HEART RATE: 85 BPM | BODY MASS INDEX: 19.63 KG/M2

## 2021-03-04 DIAGNOSIS — C77.3 BREAST CANCER METASTASIZED TO AXILLARY LYMPH NODE, RIGHT (HCC): ICD-10-CM

## 2021-03-04 DIAGNOSIS — Z17.0 MALIGNANT NEOPLASM OF OVERLAPPING SITES OF RIGHT BREAST IN FEMALE, ESTROGEN RECEPTOR POSITIVE (HCC): Primary | ICD-10-CM

## 2021-03-04 DIAGNOSIS — C50.811 MALIGNANT NEOPLASM OF OVERLAPPING SITES OF RIGHT BREAST IN FEMALE, ESTROGEN RECEPTOR POSITIVE (HCC): Primary | ICD-10-CM

## 2021-03-04 DIAGNOSIS — C50.911 BREAST CANCER METASTASIZED TO AXILLARY LYMPH NODE, RIGHT (HCC): ICD-10-CM

## 2021-03-04 PROCEDURE — 96413 CHEMO IV INFUSION 1 HR: CPT

## 2021-03-04 PROCEDURE — 96417 CHEMO IV INFUS EACH ADDL SEQ: CPT

## 2021-03-04 NOTE — PROGRESS NOTES
Pt here for C17D1.   Arrives Ambulating independently, accompanied by Self           Patient reports possible pregnancy since last therapy cycle: No    Modifications in dose or schedule: No     Frequency of blood return and site check throughout administrat

## 2021-05-11 ENCOUNTER — OFFICE VISIT (OUTPATIENT)
Dept: SURGERY | Facility: CLINIC | Age: 66
End: 2021-05-11
Payer: COMMERCIAL

## 2021-05-11 VITALS
HEIGHT: 58 IN | DIASTOLIC BLOOD PRESSURE: 94 MMHG | BODY MASS INDEX: 19.94 KG/M2 | HEART RATE: 75 BPM | WEIGHT: 95 LBS | TEMPERATURE: 97 F | SYSTOLIC BLOOD PRESSURE: 168 MMHG

## 2021-05-11 DIAGNOSIS — T79.2XXA SEROMA DUE TO TRAUMA (HCC): Primary | ICD-10-CM

## 2021-05-11 DIAGNOSIS — C77.3 BREAST CANCER METASTASIZED TO AXILLARY LYMPH NODE, RIGHT (HCC): ICD-10-CM

## 2021-05-11 DIAGNOSIS — C50.911 MALIGNANT NEOPLASM OF RIGHT FEMALE BREAST, UNSPECIFIED ESTROGEN RECEPTOR STATUS, UNSPECIFIED SITE OF BREAST (HCC): ICD-10-CM

## 2021-05-11 DIAGNOSIS — Z90.11 S/P RIGHT MASTECTOMY: ICD-10-CM

## 2021-05-11 DIAGNOSIS — C50.911 BREAST CANCER METASTASIZED TO AXILLARY LYMPH NODE, RIGHT (HCC): ICD-10-CM

## 2021-05-11 DIAGNOSIS — Z85.3 HISTORY OF RIGHT BREAST CANCER: ICD-10-CM

## 2021-05-11 DIAGNOSIS — Z95.828 PORT-A-CATH IN PLACE: ICD-10-CM

## 2021-05-11 PROCEDURE — 99214 OFFICE O/P EST MOD 30 MIN: CPT | Performed by: SURGERY

## 2021-05-11 PROCEDURE — 3077F SYST BP >= 140 MM HG: CPT | Performed by: SURGERY

## 2021-05-11 PROCEDURE — 3008F BODY MASS INDEX DOCD: CPT | Performed by: SURGERY

## 2021-05-11 PROCEDURE — 3080F DIAST BP >= 90 MM HG: CPT | Performed by: SURGERY

## 2021-05-11 NOTE — PROGRESS NOTES
Follow Up Visit Note       Active Problems      1. Seroma due to trauma ([facility] [address]    2. Malignant neoplasm of right female breast, unspecified estrogen receptor status, unspecified site of breast ([facility]    3. History of right breast cancer    4.  Breast cancer m Nausea., Disp: 30 tablet, Rfl: 3     Review of Systems  The Review of Systems has been reviewed by me during today. Review of Systems   Constitutional: Negative for diaphoresis, fever and unexpected weight change.    HENT: Negative for congestion, noseblee right chest wall reveals a protuberance in the area of the mastectomy scar consistent with a postoperative seroma. There is no cellulitis or erythema noted.          History of Present Illness    Today, I am seeing this patient for follow up- Sheila present

## 2021-05-18 ENCOUNTER — LABORATORY ENCOUNTER (OUTPATIENT)
Dept: LAB | Facility: HOSPITAL | Age: 66
End: 2021-05-18
Attending: SURGERY
Payer: COMMERCIAL

## 2021-05-18 DIAGNOSIS — Z17.0 MALIGNANT NEOPLASM OF OVERLAPPING SITES OF RIGHT BREAST IN FEMALE, ESTROGEN RECEPTOR POSITIVE (HCC): ICD-10-CM

## 2021-05-18 DIAGNOSIS — C50.811 MALIGNANT NEOPLASM OF OVERLAPPING SITES OF RIGHT BREAST IN FEMALE, ESTROGEN RECEPTOR POSITIVE (HCC): ICD-10-CM

## 2021-05-18 PROCEDURE — 85025 COMPLETE CBC W/AUTO DIFF WBC: CPT

## 2021-05-18 PROCEDURE — 36415 COLL VENOUS BLD VENIPUNCTURE: CPT

## 2021-05-18 PROCEDURE — 80053 COMPREHEN METABOLIC PANEL: CPT

## 2021-05-22 ENCOUNTER — HOSPITAL ENCOUNTER (OUTPATIENT)
Dept: ULTRASOUND IMAGING | Age: 66
Discharge: HOME | End: 2021-05-22
Attending: SURGERY
Payer: COMMERCIAL

## 2021-05-22 DIAGNOSIS — T79.2XXA SEROMA DUE TO TRAUMA (HCC): ICD-10-CM

## 2021-05-22 PROCEDURE — 76604 US EXAM CHEST: CPT | Performed by: SURGERY

## 2021-05-25 ENCOUNTER — LAB ENCOUNTER (OUTPATIENT)
Dept: LAB | Facility: HOSPITAL | Age: 66
End: 2021-05-25
Attending: SURGERY
Payer: COMMERCIAL

## 2021-05-25 DIAGNOSIS — C50.811 MALIGNANT NEOPLASM OF OVERLAPPING SITES OF RIGHT BREAST IN FEMALE, ESTROGEN RECEPTOR POSITIVE (HCC): ICD-10-CM

## 2021-05-25 DIAGNOSIS — Z17.0 MALIGNANT NEOPLASM OF OVERLAPPING SITES OF RIGHT BREAST IN FEMALE, ESTROGEN RECEPTOR POSITIVE (HCC): ICD-10-CM

## 2021-05-26 RX ORDER — ERGOCALCIFEROL 1.25 MG/1
CAPSULE ORAL
Qty: 12 CAPSULE | Refills: 0 | Status: SHIPPED | OUTPATIENT
Start: 2021-05-26 | End: 2021-12-09 | Stop reason: ALTCHOICE

## 2021-05-27 ENCOUNTER — OFFICE VISIT (OUTPATIENT)
Dept: SURGERY | Facility: CLINIC | Age: 66
End: 2021-05-27
Payer: COMMERCIAL

## 2021-05-27 VITALS
DIASTOLIC BLOOD PRESSURE: 95 MMHG | SYSTOLIC BLOOD PRESSURE: 149 MMHG | WEIGHT: 95 LBS | TEMPERATURE: 97 F | HEIGHT: 58 IN | HEART RATE: 81 BPM | BODY MASS INDEX: 19.94 KG/M2

## 2021-05-27 DIAGNOSIS — C77.3 BREAST CANCER METASTASIZED TO AXILLARY LYMPH NODE, RIGHT (HCC): ICD-10-CM

## 2021-05-27 DIAGNOSIS — Z85.3 HISTORY OF RIGHT BREAST CANCER: ICD-10-CM

## 2021-05-27 DIAGNOSIS — Z90.11 S/P RIGHT MASTECTOMY: ICD-10-CM

## 2021-05-27 DIAGNOSIS — T14.8XXA HEMATOMA: Primary | ICD-10-CM

## 2021-05-27 DIAGNOSIS — C50.911 MALIGNANT NEOPLASM OF RIGHT FEMALE BREAST, UNSPECIFIED ESTROGEN RECEPTOR STATUS, UNSPECIFIED SITE OF BREAST (HCC): ICD-10-CM

## 2021-05-27 DIAGNOSIS — C50.911 BREAST CANCER METASTASIZED TO AXILLARY LYMPH NODE, RIGHT (HCC): ICD-10-CM

## 2021-05-27 PROCEDURE — 99214 OFFICE O/P EST MOD 30 MIN: CPT | Performed by: SURGERY

## 2021-05-27 PROCEDURE — 3077F SYST BP >= 140 MM HG: CPT | Performed by: SURGERY

## 2021-05-27 PROCEDURE — 3080F DIAST BP >= 90 MM HG: CPT | Performed by: SURGERY

## 2021-05-27 PROCEDURE — 3008F BODY MASS INDEX DOCD: CPT | Performed by: SURGERY

## 2021-05-28 ENCOUNTER — ANESTHESIA EVENT (OUTPATIENT)
Dept: SURGERY | Facility: HOSPITAL | Age: 66
End: 2021-05-28
Payer: COMMERCIAL

## 2021-05-28 ENCOUNTER — ANESTHESIA (OUTPATIENT)
Dept: SURGERY | Facility: HOSPITAL | Age: 66
End: 2021-05-28
Payer: COMMERCIAL

## 2021-05-28 NOTE — ANESTHESIA PREPROCEDURE EVALUATION
PRE-OP EVALUATION    Patient Name: Alberto Solis    Admit Diagnosis: Malignant neoplasm of right female breast, unspecified estrogen receptor status, unspecified site of breast [facility] [C50.911]    Pre-op Diagnosis: Malignant neoplasm of right female br pre-op labs reviewed.   Lab Results   Component Value Date    WBC 6.0 05/18/2021    RBC 4.19 05/18/2021    HGB 12.6 05/18/2021    HCT 38.7 05/18/2021    MCV 92.4 05/18/2021    MCH 30.1 05/18/2021    MCHC 32.6 05/18/2021    RDW 13.6 05/18/2021    .0 0

## 2021-05-29 ENCOUNTER — HOSPITAL ENCOUNTER (OUTPATIENT)
Facility: HOSPITAL | Age: 66
Setting detail: HOSPITAL OUTPATIENT SURGERY
Discharge: HOME | End: 2021-05-29
Attending: SURGERY | Admitting: SURGERY
Payer: COMMERCIAL

## 2021-05-29 VITALS
WEIGHT: 95 LBS | TEMPERATURE: 98 F | OXYGEN SATURATION: 96 % | HEART RATE: 61 BPM | SYSTOLIC BLOOD PRESSURE: 110 MMHG | DIASTOLIC BLOOD PRESSURE: 62 MMHG | HEIGHT: 58.5 IN | RESPIRATION RATE: 18 BRPM | BODY MASS INDEX: 19.41 KG/M2

## 2021-05-29 DIAGNOSIS — C50.811 MALIGNANT NEOPLASM OF OVERLAPPING SITES OF RIGHT BREAST IN FEMALE, ESTROGEN RECEPTOR POSITIVE (HCC): Primary | ICD-10-CM

## 2021-05-29 DIAGNOSIS — Z17.0 MALIGNANT NEOPLASM OF OVERLAPPING SITES OF RIGHT BREAST IN FEMALE, ESTROGEN RECEPTOR POSITIVE (HCC): Primary | ICD-10-CM

## 2021-05-29 DIAGNOSIS — C50.911 MALIGNANT NEOPLASM OF RIGHT FEMALE BREAST, UNSPECIFIED ESTROGEN RECEPTOR STATUS, UNSPECIFIED SITE OF BREAST (HCC): ICD-10-CM

## 2021-05-29 PROCEDURE — 0JPT0WZ REMOVAL OF TOTALLY IMPLANTABLE VASCULAR ACCESS DEVICE FROM TRUNK SUBCUTANEOUS TISSUE AND FASCIA, OPEN APPROACH: ICD-10-PCS | Performed by: SURGERY

## 2021-05-29 PROCEDURE — 05PY03Z REMOVAL OF INFUSION DEVICE FROM UPPER VEIN, OPEN APPROACH: ICD-10-PCS | Performed by: SURGERY

## 2021-05-29 PROCEDURE — 88305 TISSUE EXAM BY PATHOLOGIST: CPT | Performed by: SURGERY

## 2021-05-29 RX ORDER — HYDROCODONE BITARTRATE AND ACETAMINOPHEN 5; 325 MG/1; MG/1
1 TABLET ORAL AS NEEDED
Status: DISCONTINUED | OUTPATIENT
Start: 2021-05-29 | End: 2021-05-29

## 2021-05-29 RX ORDER — MEPERIDINE HYDROCHLORIDE 25 MG/ML
12.5 INJECTION INTRAMUSCULAR; INTRAVENOUS; SUBCUTANEOUS AS NEEDED
Status: DISCONTINUED | OUTPATIENT
Start: 2021-05-29 | End: 2021-05-29

## 2021-05-29 RX ORDER — LIDOCAINE HYDROCHLORIDE 10 MG/ML
INJECTION, SOLUTION EPIDURAL; INFILTRATION; INTRACAUDAL; PERINEURAL AS NEEDED
Status: DISCONTINUED | OUTPATIENT
Start: 2021-05-29 | End: 2021-05-29 | Stop reason: SURG

## 2021-05-29 RX ORDER — LABETALOL HYDROCHLORIDE 5 MG/ML
5 INJECTION, SOLUTION INTRAVENOUS EVERY 5 MIN PRN
Status: DISCONTINUED | OUTPATIENT
Start: 2021-05-29 | End: 2021-05-29

## 2021-05-29 RX ORDER — NALOXONE HYDROCHLORIDE 0.4 MG/ML
80 INJECTION, SOLUTION INTRAMUSCULAR; INTRAVENOUS; SUBCUTANEOUS AS NEEDED
Status: DISCONTINUED | OUTPATIENT
Start: 2021-05-29 | End: 2021-05-29

## 2021-05-29 RX ORDER — DEXAMETHASONE SODIUM PHOSPHATE 4 MG/ML
4 VIAL (ML) INJECTION AS NEEDED
Status: DISCONTINUED | OUTPATIENT
Start: 2021-05-29 | End: 2021-05-29

## 2021-05-29 RX ORDER — ONDANSETRON 2 MG/ML
INJECTION INTRAMUSCULAR; INTRAVENOUS AS NEEDED
Status: DISCONTINUED | OUTPATIENT
Start: 2021-05-29 | End: 2021-05-29 | Stop reason: SURG

## 2021-05-29 RX ORDER — BUPIVACAINE HYDROCHLORIDE AND EPINEPHRINE 5; 5 MG/ML; UG/ML
INJECTION, SOLUTION EPIDURAL; INTRACAUDAL; PERINEURAL AS NEEDED
Status: DISCONTINUED | OUTPATIENT
Start: 2021-05-29 | End: 2021-05-29 | Stop reason: HOSPADM

## 2021-05-29 RX ORDER — HYDROCODONE BITARTRATE AND ACETAMINOPHEN 5; 325 MG/1; MG/1
2 TABLET ORAL AS NEEDED
Status: DISCONTINUED | OUTPATIENT
Start: 2021-05-29 | End: 2021-05-29

## 2021-05-29 RX ORDER — MIDAZOLAM HYDROCHLORIDE 1 MG/ML
1 INJECTION INTRAMUSCULAR; INTRAVENOUS EVERY 5 MIN PRN
Status: DISCONTINUED | OUTPATIENT
Start: 2021-05-29 | End: 2021-05-29

## 2021-05-29 RX ORDER — ACETAMINOPHEN 500 MG
1000 TABLET ORAL EVERY 6 HOURS PRN
COMMUNITY

## 2021-05-29 RX ORDER — CEFADROXIL 500 MG/1
500 CAPSULE ORAL 2 TIMES DAILY
Qty: 10 CAPSULE | Refills: 0 | Status: SHIPPED | OUTPATIENT
Start: 2021-05-29 | End: 2021-06-03

## 2021-05-29 RX ORDER — ACETAMINOPHEN 500 MG
1000 TABLET ORAL ONCE AS NEEDED
Status: DISCONTINUED | OUTPATIENT
Start: 2021-05-29 | End: 2021-05-29

## 2021-05-29 RX ORDER — DEXAMETHASONE SODIUM PHOSPHATE 4 MG/ML
VIAL (ML) INJECTION AS NEEDED
Status: DISCONTINUED | OUTPATIENT
Start: 2021-05-29 | End: 2021-05-29 | Stop reason: SURG

## 2021-05-29 RX ORDER — METOCLOPRAMIDE HYDROCHLORIDE 5 MG/ML
10 INJECTION INTRAMUSCULAR; INTRAVENOUS AS NEEDED
Status: DISCONTINUED | OUTPATIENT
Start: 2021-05-29 | End: 2021-05-29

## 2021-05-29 RX ORDER — ACETAMINOPHEN 500 MG
1000 TABLET ORAL ONCE
Status: DISCONTINUED | OUTPATIENT
Start: 2021-05-29 | End: 2021-05-29 | Stop reason: HOSPADM

## 2021-05-29 RX ORDER — MIDAZOLAM HYDROCHLORIDE 1 MG/ML
INJECTION INTRAMUSCULAR; INTRAVENOUS AS NEEDED
Status: DISCONTINUED | OUTPATIENT
Start: 2021-05-29 | End: 2021-05-29 | Stop reason: SURG

## 2021-05-29 RX ORDER — ONDANSETRON 2 MG/ML
4 INJECTION INTRAMUSCULAR; INTRAVENOUS AS NEEDED
Status: DISCONTINUED | OUTPATIENT
Start: 2021-05-29 | End: 2021-05-29

## 2021-05-29 RX ORDER — HYDROMORPHONE HYDROCHLORIDE 1 MG/ML
0.4 INJECTION, SOLUTION INTRAMUSCULAR; INTRAVENOUS; SUBCUTANEOUS EVERY 5 MIN PRN
Status: DISCONTINUED | OUTPATIENT
Start: 2021-05-29 | End: 2021-05-29

## 2021-05-29 RX ORDER — CEFAZOLIN SODIUM/WATER 2 G/20 ML
SYRINGE (ML) INTRAVENOUS
Status: DISCONTINUED
Start: 2021-05-29 | End: 2021-05-29

## 2021-05-29 RX ORDER — SODIUM CHLORIDE, SODIUM LACTATE, POTASSIUM CHLORIDE, CALCIUM CHLORIDE 600; 310; 30; 20 MG/100ML; MG/100ML; MG/100ML; MG/100ML
INJECTION, SOLUTION INTRAVENOUS CONTINUOUS
Status: DISCONTINUED | OUTPATIENT
Start: 2021-05-29 | End: 2021-05-29

## 2021-05-29 RX ORDER — METOCLOPRAMIDE HYDROCHLORIDE 5 MG/ML
INJECTION INTRAMUSCULAR; INTRAVENOUS AS NEEDED
Status: DISCONTINUED | OUTPATIENT
Start: 2021-05-29 | End: 2021-05-29 | Stop reason: SURG

## 2021-05-29 RX ORDER — LIDOCAINE HYDROCHLORIDE 10 MG/ML
INJECTION, SOLUTION INFILTRATION; PERINEURAL AS NEEDED
Status: DISCONTINUED | OUTPATIENT
Start: 2021-05-29 | End: 2021-05-29 | Stop reason: HOSPADM

## 2021-05-29 RX ORDER — CEFAZOLIN SODIUM/WATER 2 G/20 ML
2 SYRINGE (ML) INTRAVENOUS ONCE
Status: COMPLETED | OUTPATIENT
Start: 2021-05-29 | End: 2021-05-29

## 2021-05-29 RX ADMIN — METOCLOPRAMIDE HYDROCHLORIDE 10 MG: 5 INJECTION INTRAMUSCULAR; INTRAVENOUS at 08:06:00

## 2021-05-29 RX ADMIN — ONDANSETRON 4 MG: 2 INJECTION INTRAMUSCULAR; INTRAVENOUS at 08:06:00

## 2021-05-29 RX ADMIN — CEFAZOLIN SODIUM/WATER 2 G: 2 G/20 ML SYRINGE (ML) INTRAVENOUS at 07:55:00

## 2021-05-29 RX ADMIN — MIDAZOLAM HYDROCHLORIDE 1 MG: 1 INJECTION INTRAMUSCULAR; INTRAVENOUS at 07:50:00

## 2021-05-29 RX ADMIN — SODIUM CHLORIDE, SODIUM LACTATE, POTASSIUM CHLORIDE, CALCIUM CHLORIDE: 600; 310; 30; 20 INJECTION, SOLUTION INTRAVENOUS at 08:42:00

## 2021-05-29 RX ADMIN — MIDAZOLAM HYDROCHLORIDE 2 MG: 1 INJECTION INTRAMUSCULAR; INTRAVENOUS at 07:41:00

## 2021-05-29 RX ADMIN — LIDOCAINE HYDROCHLORIDE 100 MG: 10 INJECTION, SOLUTION EPIDURAL; INFILTRATION; INTRACAUDAL; PERINEURAL at 07:57:00

## 2021-05-29 RX ADMIN — DEXAMETHASONE SODIUM PHOSPHATE 4 MG: 4 MG/ML VIAL (ML) INJECTION at 08:06:00

## 2021-05-29 NOTE — H&P
History & Physical Examination    Patient Name: Jovanna Dudley  MRN: [id number]  [facility]: [id number]  YOB: 1955    Diagnosis: right breast cancer    Present Illness: 63 yr old female with prior diagnosis right breast cancer s/ right mastectomy chemotherapy     end, 2021     Past Surgical History:   Procedure Laterality Date   • BREAST BIOPSY     • MASTECTOMY RIGHT     • OTHER SURGICAL HISTORY          • PORT, INDWELLING, IMP  2020     Family History   Problem Relation Age o

## 2021-05-29 NOTE — ANESTHESIA POSTPROCEDURE EVALUATION
Barbie 74 Patient Status:  Hospital Outpatient Surgery   Age/Gender 63year old female MRN [id number]   Location [address] [place] [address] Attending Meliza Kinney, [address] [facility] Se Day # 0 PCP MD Radha Foote [facility]

## 2021-05-29 NOTE — OR NURSING
This writer came in to pt's room to start IV. This writer asked pt if she had any restrictions on either arm. Pt stated \"no\". 22g was started in [facility] without difficulty. After IV was inserted, this writer was asking pt more about her surgery.   She sta

## 2021-05-30 NOTE — OPERATIVE REPORT
[facility]  Operative Note    [place] Location: OR   [facility] [id number] MRN [id number]   Admission Date 5/29/2021 Operation Date 5/29/2021   Attending Physician No att. providers found Operating Physician Theodore Reid MD     Date of procedure: was marked in the preoperative holding area and the marking was confirmed by the patient. Summary of case:     When he was taken to the operating room and was placed on the OR table in supine position.   After the induction of anesthesia, perioperative a patient was taken to the recovery area in good condition.     EBL:            10cc    Pathologic Specimen:    Biopsy of hematoma cavity right chest wall    Drains:            Kodi drain x1    Shirley Robles MD

## 2021-06-03 ENCOUNTER — OFFICE VISIT (OUTPATIENT)
Dept: SURGERY | Facility: CLINIC | Age: 66
End: 2021-06-03
Payer: COMMERCIAL

## 2021-06-03 VITALS — TEMPERATURE: 98 F

## 2021-06-03 DIAGNOSIS — Z90.11 S/P RIGHT MASTECTOMY: ICD-10-CM

## 2021-06-03 DIAGNOSIS — C50.911 BREAST CANCER METASTASIZED TO AXILLARY LYMPH NODE, RIGHT (HCC): ICD-10-CM

## 2021-06-03 DIAGNOSIS — C77.3 BREAST CANCER METASTASIZED TO AXILLARY LYMPH NODE, RIGHT (HCC): ICD-10-CM

## 2021-06-03 DIAGNOSIS — C50.911 MALIGNANT NEOPLASM OF RIGHT FEMALE BREAST, UNSPECIFIED ESTROGEN RECEPTOR STATUS, UNSPECIFIED SITE OF BREAST (HCC): Primary | ICD-10-CM

## 2021-06-03 PROCEDURE — 99214 OFFICE O/P EST MOD 30 MIN: CPT | Performed by: SURGERY

## 2021-06-03 NOTE — PROGRESS NOTES
Follow Up Visit Note       Active Problems      1. Malignant neoplasm of right female breast, unspecified estrogen receptor status, unspecified site of breast ([facility] [address]    2. Breast cancer metastasized to axillary lymph node, right ([facility] [address]    3.  S/P right mastec Negative for activity change, appetite change, chills, diaphoresis, fatigue and fever. Respiratory: Negative for chest tightness. Cardiovascular: Negative for chest pain, palpitations and leg swelling.    Gastrointestinal: Negative for constipation, di mammogram will be due in July 2021 for continued surveillance of the left breast.  She will follow-up with me after mammogram is completed    Thank you for allowing me to participate in your patient's care       No orders of the defined types were placed i

## 2021-06-09 NOTE — PROGRESS NOTES
Follow Up Visit Note       Active Problems      1. Hematoma    2. Malignant neoplasm of right female breast, unspecified estrogen receptor status, unspecified site of breast ([facility]    3. Breast cancer metastasized to axillary lymph node, right ([facility] [address]    4.  H on 6/3/2021 ), Disp: , Rfl:      Review of Systems  The Review of Systems has been reviewed by me during today. Review of Systems   Constitutional: Negative for diaphoresis, fever and unexpected weight change.    HENT: Negative for congestion, nosebleeds, wall-protuberance noted in the lateral aspect. No cellulitis or erythema noted. The surgical incision is intact.          History of Present Illness    Today, I am seeing this patient for follow up-Sheila is here today for follow-up of right chest wall ult for genetic testing.  Genetic testing has been negative.     hSeila did have bilateral breast MRI 7/23/2020.  This showed good response in the right breast and axilla with no further enhancement noted.  No areas of enhancement noted in the left breast.      and concurrent drainage of right chest wall abscess with drain    Thank you for allowing me to participate in your patient's care       No orders of the defined types were placed in this encounter.       Imaging & Referrals   None    Follow Up  No follow-up

## 2021-07-08 ENCOUNTER — MED REC SCAN ONLY (OUTPATIENT)
Dept: SURGERY | Facility: CLINIC | Age: 66
End: 2021-07-08

## 2021-07-21 ENCOUNTER — NURSE NAVIGATOR ENCOUNTER (OUTPATIENT)
Dept: HEMATOLOGY/ONCOLOGY | Facility: HOSPITAL | Age: 66
End: 2021-07-21

## 2021-07-21 ENCOUNTER — NURSE ONLY (OUTPATIENT)
Dept: HEMATOLOGY/ONCOLOGY | Facility: HOSPITAL | Age: 66
End: 2021-07-21
Attending: INTERNAL MEDICINE
Payer: COMMERCIAL

## 2021-07-21 VITALS
TEMPERATURE: 97 F | SYSTOLIC BLOOD PRESSURE: 175 MMHG | BODY MASS INDEX: 20.78 KG/M2 | HEART RATE: 66 BPM | HEIGHT: 57.72 IN | RESPIRATION RATE: 16 BRPM | WEIGHT: 99 LBS | DIASTOLIC BLOOD PRESSURE: 78 MMHG | OXYGEN SATURATION: 98 %

## 2021-07-21 DIAGNOSIS — Z17.0 MALIGNANT NEOPLASM OF OVERLAPPING SITES OF RIGHT BREAST IN FEMALE, ESTROGEN RECEPTOR POSITIVE (HCC): Primary | ICD-10-CM

## 2021-07-21 DIAGNOSIS — C50.811 MALIGNANT NEOPLASM OF OVERLAPPING SITES OF RIGHT BREAST IN FEMALE, ESTROGEN RECEPTOR POSITIVE (HCC): ICD-10-CM

## 2021-07-21 DIAGNOSIS — Z17.0 MALIGNANT NEOPLASM OF OVERLAPPING SITES OF RIGHT BREAST IN FEMALE, ESTROGEN RECEPTOR POSITIVE (HCC): ICD-10-CM

## 2021-07-21 DIAGNOSIS — C50.811 MALIGNANT NEOPLASM OF OVERLAPPING SITES OF RIGHT BREAST IN FEMALE, ESTROGEN RECEPTOR POSITIVE (HCC): Primary | ICD-10-CM

## 2021-07-21 DIAGNOSIS — D63.0 ANEMIA COMPLICATING NEOPLASTIC DISEASE: ICD-10-CM

## 2021-07-21 DIAGNOSIS — E55.9 VITAMIN D DEFICIENCY: ICD-10-CM

## 2021-07-21 LAB
ALBUMIN SERPL-MCNC: 3.9 G/DL (ref 3.4–5)
ALBUMIN/GLOB SERPL: 1.1 {RATIO} (ref 1–2)
ALP LIVER SERPL-CCNC: 56 U/L
ALT SERPL-CCNC: 19 U/L
ANION GAP SERPL CALC-SCNC: 3 MMOL/L (ref 0–18)
AST SERPL-CCNC: 15 U/L (ref 15–37)
BASOPHILS # BLD AUTO: 0.03 X10(3) UL (ref 0–0.2)
BASOPHILS NFR BLD AUTO: 0.4 %
BILIRUB SERPL-MCNC: 0.4 MG/DL (ref 0.1–2)
BUN BLD-MCNC: 15 MG/DL (ref 7–18)
BUN/CREAT SERPL: 14.6 (ref 10–20)
CALCIUM BLD-MCNC: 9 MG/DL (ref 8.5–10.1)
CHLORIDE SERPL-SCNC: 108 MMOL/L (ref 98–112)
CO2 SERPL-SCNC: 29 MMOL/L (ref 21–32)
CREAT BLD-MCNC: 1.03 MG/DL
DEPRECATED RDW RBC AUTO: 46.3 FL (ref 35.1–46.3)
EOSINOPHIL # BLD AUTO: 0.13 X10(3) UL (ref 0–0.7)
EOSINOPHIL NFR BLD AUTO: 1.9 %
ERYTHROCYTE [DISTWIDTH] IN BLOOD BY AUTOMATED COUNT: 13.6 % (ref 11–15)
GLOBULIN PLAS-MCNC: 3.4 G/DL (ref 2.8–4.4)
GLUCOSE BLD-MCNC: 92 MG/DL (ref 70–99)
HCT VFR BLD AUTO: 38.1 %
HGB BLD-MCNC: 12.1 G/DL
IMM GRANULOCYTES # BLD AUTO: 0.02 X10(3) UL (ref 0–1)
IMM GRANULOCYTES NFR BLD: 0.3 %
LDH SERPL L TO P-CCNC: 176 U/L
LYMPHOCYTES # BLD AUTO: 1.43 X10(3) UL (ref 1–4)
LYMPHOCYTES NFR BLD AUTO: 20.4 %
M PROTEIN MFR SERPL ELPH: 7.3 G/DL (ref 6.4–8.2)
MCH RBC QN AUTO: 29.5 PG (ref 26–34)
MCHC RBC AUTO-ENTMCNC: 31.8 G/DL (ref 31–37)
MCV RBC AUTO: 92.9 FL
MONOCYTES # BLD AUTO: 0.5 X10(3) UL (ref 0.1–1)
MONOCYTES NFR BLD AUTO: 7.1 %
NEUTROPHILS # BLD AUTO: 4.91 X10 (3) UL (ref 1.5–7.7)
NEUTROPHILS # BLD AUTO: 4.91 X10(3) UL (ref 1.5–7.7)
NEUTROPHILS NFR BLD AUTO: 69.9 %
OSMOLALITY SERPL CALC.SUM OF ELEC: 290 MOSM/KG (ref 275–295)
PLATELET # BLD AUTO: 205 10(3)UL (ref 150–450)
POTASSIUM SERPL-SCNC: 3.8 MMOL/L (ref 3.5–5.1)
RBC # BLD AUTO: 4.1 X10(6)UL
SODIUM SERPL-SCNC: 140 MMOL/L (ref 136–145)
VIT D+METAB SERPL-MCNC: 79.2 NG/ML (ref 30–100)
WBC # BLD AUTO: 7 X10(3) UL (ref 4–11)

## 2021-07-21 PROCEDURE — 99214 OFFICE O/P EST MOD 30 MIN: CPT | Performed by: INTERNAL MEDICINE

## 2021-07-21 PROCEDURE — 82306 VITAMIN D 25 HYDROXY: CPT

## 2021-07-21 PROCEDURE — 83615 LACTATE (LD) (LDH) ENZYME: CPT

## 2021-07-21 PROCEDURE — 80053 COMPREHEN METABOLIC PANEL: CPT

## 2021-07-21 PROCEDURE — 85025 COMPLETE CBC W/AUTO DIFF WBC: CPT

## 2021-07-21 NOTE — PROGRESS NOTES
Education Record    Learner:  Patient    Disease / Diagnosis: breast cancer    Barriers / Limitations:  None   Comments:    Method:  Discussion   Comments:    General Topics:  Plan of care reviewed   Comments:    Outcome:  Shows understanding   Comments:

## 2021-07-21 NOTE — PROGRESS NOTES
Cancer Center Progress Note    Problem List:      Patient Active Problem List:     Malignant neoplasm of overlapping sites of right breast in female, estrogen receptor positive ([facility] [address]     Breast cancer metastasized to axillary lymph node, right ([facility] [address]     Br Diagnosis Date   • Cancer of overlapping sites of right female breast [facility])     rt breast   • Personal history of antineoplastic chemotherapy     end, 03/2021       Past Surgical History:   Procedure Laterality Date   • BREAST BIOPSY     • MASTECTOMY RIG 07/21/2021    RBC 4.10 07/21/2021    HGB 12.1 07/21/2021    HCT 38.1 07/21/2021    MCV 92.9 07/21/2021    MCH 29.5 07/21/2021    MCHC 31.8 07/21/2021    RDW 13.6 07/21/2021    .0 07/21/2021     Lab Results   Component Value Date     07/21/2021 0.633      Femoral neck T-Score:  -1.9      % young normals:  75      % age matched controls:  80      Change from prior hip examination:  N/A         ADDITIONAL FINDINGS:  The 10 year fracture risk for a major osteoporotic fracture is 8.7 %.   The 10 year

## 2021-07-21 NOTE — PROGRESS NOTES
Saw patient during Dr. Yu Confer clinic and we discussed the [facility] survivorship program and what it entails and how to schedule an appointment. Gave patient information for the breast and gynecological support group.  Patient thanked me for discussing this topic

## 2021-10-19 ENCOUNTER — TELEPHONE (OUTPATIENT)
Dept: HEMATOLOGY/ONCOLOGY | Facility: HOSPITAL | Age: 66
End: 2021-10-19

## 2021-11-05 ENCOUNTER — TELEPHONE (OUTPATIENT)
Dept: HEMATOLOGY/ONCOLOGY | Facility: HOSPITAL | Age: 66
End: 2021-11-05

## 2021-11-05 DIAGNOSIS — Z51.81 ENCOUNTER FOR MONITORING CARDIOTOXIC DRUG THERAPY: Primary | ICD-10-CM

## 2021-11-05 DIAGNOSIS — Z79.899 ENCOUNTER FOR MONITORING CARDIOTOXIC DRUG THERAPY: Primary | ICD-10-CM

## 2021-11-05 DIAGNOSIS — Z17.0 MALIGNANT NEOPLASM OF OVERLAPPING SITES OF RIGHT BREAST IN FEMALE, ESTROGEN RECEPTOR POSITIVE (HCC): ICD-10-CM

## 2021-11-05 DIAGNOSIS — C50.811 MALIGNANT NEOPLASM OF OVERLAPPING SITES OF RIGHT BREAST IN FEMALE, ESTROGEN RECEPTOR POSITIVE (HCC): ICD-10-CM

## 2021-11-30 RX ORDER — ANASTROZOLE 1 MG/1
TABLET ORAL
Qty: 90 TABLET | Refills: 3 | Status: SHIPPED | OUTPATIENT
Start: 2021-11-30 | End: 2022-11-14

## 2021-12-09 ENCOUNTER — OFFICE VISIT (OUTPATIENT)
Dept: HEMATOLOGY/ONCOLOGY | Facility: HOSPITAL | Age: 66
End: 2021-12-09
Attending: NURSE PRACTITIONER
Payer: COMMERCIAL

## 2021-12-09 DIAGNOSIS — Z71.9 COUNSELING, UNSPECIFIED: ICD-10-CM

## 2021-12-09 DIAGNOSIS — C50.811 MALIGNANT NEOPLASM OF OVERLAPPING SITES OF RIGHT BREAST IN FEMALE, ESTROGEN RECEPTOR POSITIVE (HCC): ICD-10-CM

## 2021-12-09 DIAGNOSIS — M79.89 RIGHT AXILLARY SWELLING: Primary | ICD-10-CM

## 2021-12-09 DIAGNOSIS — Z17.0 MALIGNANT NEOPLASM OF OVERLAPPING SITES OF RIGHT BREAST IN FEMALE, ESTROGEN RECEPTOR POSITIVE (HCC): ICD-10-CM

## 2021-12-09 DIAGNOSIS — Z08 ENCOUNTER FOR FOLLOW-UP EXAMINATION AFTER COMPLETED TREATMENT FOR MALIGNANT NEOPLASM: ICD-10-CM

## 2021-12-09 PROCEDURE — 99215 OFFICE O/P EST HI 40 MIN: CPT | Performed by: NURSE PRACTITIONER

## 2021-12-09 RX ORDER — ACETAMINOPHEN 160 MG
2000 TABLET,DISINTEGRATING ORAL DAILY
COMMUNITY

## 2021-12-09 RX ORDER — PHENOL 1.4 %
600 AEROSOL, SPRAY (ML) MUCOUS MEMBRANE 2 TIMES DAILY
COMMUNITY

## 2021-12-09 NOTE — PROGRESS NOTES
I met with Sheila for a Survivorship Clinic visit to provide a survivorship care plan (SCP) and information related to post-treatment care. She has had a long course of treatment for her Stage IIIA ER+, NE- HER2+ right breast cancer diagnosed in 3/2020. a letter and SCP will be sent to her PCP, Dr. Renaldo Litten. Reviewed Cancer Surveillance (office visits, imaging, other) and that Dr. Elmira Escobar will oversee this care. She will see him in April.   She has an order for left mammogram in April and s may add in her elliptical at home. We discussed strength training 2-3x/week. She and her family eat healthy. Current BMI is 21. We reviewed skin care and sun safety.      The patient received a take-home folder that included the following survivorship re

## 2021-12-17 RX ORDER — ZOLEDRONIC ACID 5 MG/100ML
5 INJECTION, SOLUTION INTRAVENOUS ONCE
Status: CANCELLED | OUTPATIENT
Start: 2021-12-17

## 2021-12-20 ENCOUNTER — OFFICE VISIT (OUTPATIENT)
Dept: HEMATOLOGY/ONCOLOGY | Facility: HOSPITAL | Age: 66
End: 2021-12-20
Attending: INTERNAL MEDICINE
Payer: COMMERCIAL

## 2021-12-20 VITALS
HEART RATE: 71 BPM | TEMPERATURE: 98 F | OXYGEN SATURATION: 100 % | HEIGHT: 57.72 IN | RESPIRATION RATE: 16 BRPM | SYSTOLIC BLOOD PRESSURE: 136 MMHG | WEIGHT: 103 LBS | DIASTOLIC BLOOD PRESSURE: 84 MMHG | BODY MASS INDEX: 21.62 KG/M2

## 2021-12-20 DIAGNOSIS — C50.911 BREAST CANCER METASTASIZED TO AXILLARY LYMPH NODE, RIGHT (HCC): ICD-10-CM

## 2021-12-20 DIAGNOSIS — M85.851 OSTEOPENIA OF NECKS OF BOTH FEMURS: Primary | ICD-10-CM

## 2021-12-20 DIAGNOSIS — Z17.0 MALIGNANT NEOPLASM OF OVERLAPPING SITES OF RIGHT BREAST IN FEMALE, ESTROGEN RECEPTOR POSITIVE (HCC): ICD-10-CM

## 2021-12-20 DIAGNOSIS — M81.8 OSTEOPOROSIS DUE TO AROMATASE INHIBITOR: ICD-10-CM

## 2021-12-20 DIAGNOSIS — T38.6X5A OSTEOPOROSIS DUE TO AROMATASE INHIBITOR: ICD-10-CM

## 2021-12-20 DIAGNOSIS — M85.852 OSTEOPENIA OF NECKS OF BOTH FEMURS: Primary | ICD-10-CM

## 2021-12-20 DIAGNOSIS — C77.3 BREAST CANCER METASTASIZED TO AXILLARY LYMPH NODE, RIGHT (HCC): ICD-10-CM

## 2021-12-20 DIAGNOSIS — C50.811 MALIGNANT NEOPLASM OF OVERLAPPING SITES OF RIGHT BREAST IN FEMALE, ESTROGEN RECEPTOR POSITIVE (HCC): ICD-10-CM

## 2021-12-20 PROCEDURE — 82310 ASSAY OF CALCIUM: CPT

## 2021-12-20 PROCEDURE — 82565 ASSAY OF CREATININE: CPT

## 2021-12-20 PROCEDURE — 82040 ASSAY OF SERUM ALBUMIN: CPT

## 2021-12-20 PROCEDURE — 96365 THER/PROPH/DIAG IV INF INIT: CPT

## 2021-12-20 RX ORDER — ZOLEDRONIC ACID 5 MG/100ML
5 INJECTION, SOLUTION INTRAVENOUS ONCE
Status: CANCELLED | OUTPATIENT
Start: 2021-12-20

## 2021-12-20 RX ORDER — ZOLEDRONIC ACID 5 MG/100ML
5 INJECTION, SOLUTION INTRAVENOUS ONCE
Status: COMPLETED | OUTPATIENT
Start: 2021-12-20 | End: 2021-12-20

## 2021-12-20 RX ADMIN — ZOLEDRONIC ACID 5 MG: 5 INJECTION, SOLUTION INTRAVENOUS at 14:50:00

## 2021-12-20 NOTE — PROGRESS NOTES
Pt here for Reclast.  Arrives Ambulating independently, accompanied by Self           Pregnancy screening: Denies possibility of pregnancy    Modifications in dose or schedule: No     Frequency of blood return and site check throughout administration: Rylee

## 2022-01-21 ENCOUNTER — HOSPITAL ENCOUNTER (OUTPATIENT)
Dept: MAMMOGRAPHY | Facility: HOSPITAL | Age: 67
Discharge: HOME | End: 2022-01-21
Attending: INTERNAL MEDICINE
Payer: COMMERCIAL

## 2022-01-21 DIAGNOSIS — C50.811 MALIGNANT NEOPLASM OF OVERLAPPING SITES OF RIGHT BREAST IN FEMALE, ESTROGEN RECEPTOR POSITIVE (HCC): ICD-10-CM

## 2022-01-21 DIAGNOSIS — Z17.0 MALIGNANT NEOPLASM OF OVERLAPPING SITES OF RIGHT BREAST IN FEMALE, ESTROGEN RECEPTOR POSITIVE (HCC): ICD-10-CM

## 2022-01-21 PROCEDURE — 77061 BREAST TOMOSYNTHESIS UNI: CPT | Performed by: INTERNAL MEDICINE

## 2022-01-21 PROCEDURE — 77065 DX MAMMO INCL CAD UNI: CPT | Performed by: INTERNAL MEDICINE

## 2022-01-27 ENCOUNTER — OFFICE VISIT (OUTPATIENT)
Dept: HEMATOLOGY/ONCOLOGY | Facility: HOSPITAL | Age: 67
End: 2022-01-27
Attending: INTERNAL MEDICINE
Payer: COMMERCIAL

## 2022-01-27 VITALS
SYSTOLIC BLOOD PRESSURE: 165 MMHG | DIASTOLIC BLOOD PRESSURE: 90 MMHG | HEIGHT: 57.72 IN | OXYGEN SATURATION: 100 % | TEMPERATURE: 96 F | HEART RATE: 89 BPM | BODY MASS INDEX: 21.51 KG/M2 | RESPIRATION RATE: 16 BRPM | WEIGHT: 102.5 LBS

## 2022-01-27 DIAGNOSIS — Z17.0 MALIGNANT NEOPLASM OF OVERLAPPING SITES OF RIGHT BREAST IN FEMALE, ESTROGEN RECEPTOR POSITIVE (HCC): Primary | ICD-10-CM

## 2022-01-27 DIAGNOSIS — C50.811 MALIGNANT NEOPLASM OF OVERLAPPING SITES OF RIGHT BREAST IN FEMALE, ESTROGEN RECEPTOR POSITIVE (HCC): Primary | ICD-10-CM

## 2022-01-27 DIAGNOSIS — E55.9 VITAMIN D DEFICIENCY: ICD-10-CM

## 2022-01-27 DIAGNOSIS — M81.8 OSTEOPOROSIS DUE TO AROMATASE INHIBITOR: ICD-10-CM

## 2022-01-27 DIAGNOSIS — T38.6X5A OSTEOPOROSIS DUE TO AROMATASE INHIBITOR: ICD-10-CM

## 2022-01-27 PROCEDURE — 99214 OFFICE O/P EST MOD 30 MIN: CPT | Performed by: INTERNAL MEDICINE

## 2022-01-27 NOTE — PROGRESS NOTES
Patient is here for follow up for breast cancer on anastrozole. She is tolerating this medication well. She denies any hot flashes or joint aches. Her appetite and energy are good. She offers no complaints.      Education Record    Learner:  Patient    YANI

## 2022-01-27 NOTE — PROGRESS NOTES
Cancer Center Progress Note    Problem List:      Patient Active Problem List:     Malignant neoplasm of overlapping sites of right breast in female, estrogen receptor positive ([facility] [address]     Breast cancer metastasized to axillary lymph node, right ([facility] [address]     Br history of antineoplastic chemotherapy     end, 2021       Past Surgical History:   Procedure Laterality Date   • BREAST BIOPSY     • CHEMOTHERAPY  2020    neoadjuvent therapy   • MASTECTOMY RIGHT     • OTHER SURGICAL HISTORY          • appropriate for this visit.        Labs reviewed at this visit:     Lab Results   Component Value Date    WBC 7.0 07/21/2021    RBC 4.10 07/21/2021    HGB 12.1 07/21/2021    HCT 38.1 07/21/2021    MCV 92.9 07/21/2021    MCH 29.5 07/21/2021    MCHC 31.8 07/2 Total Hip T-Score:  -0.6      % young normals:  58      % age matched controls:  80      Change from prior hip examination:  N/A               Left FEMORAL NECK ANALYSIS RESULTS:        Bone mineral density (BMD) (g/cm2):  0.633      Femoral neck T-Score

## 2022-07-11 NOTE — PROGRESS NOTES
Heparin rate decreased by 3 from 18 to 15 . Next Aptt is due at 10:30am . Pt is stable . No bleeding . No c/o pain or discomfort. Bed down SR up x 2 HOB. Call bell in reach.   Pt here for C12D1.   Arrives Ambulating independently, accompanied by Self           Patient reports possible pregnancy since last therapy cycle: No    Modifications in dose or schedule: No     Frequency of blood return and site check throughout administrat

## 2022-08-29 ENCOUNTER — OFFICE VISIT (OUTPATIENT)
Dept: HEMATOLOGY/ONCOLOGY | Facility: HOSPITAL | Age: 67
End: 2022-08-29
Attending: INTERNAL MEDICINE
Payer: COMMERCIAL

## 2022-08-29 VITALS
OXYGEN SATURATION: 100 % | WEIGHT: 102.81 LBS | SYSTOLIC BLOOD PRESSURE: 149 MMHG | HEART RATE: 84 BPM | TEMPERATURE: 98 F | BODY MASS INDEX: 22 KG/M2 | DIASTOLIC BLOOD PRESSURE: 82 MMHG | RESPIRATION RATE: 16 BRPM

## 2022-08-29 DIAGNOSIS — C77.3 BREAST CANCER METASTASIZED TO AXILLARY LYMPH NODE, RIGHT (HCC): ICD-10-CM

## 2022-08-29 DIAGNOSIS — Z17.0 MALIGNANT NEOPLASM OF OVERLAPPING SITES OF RIGHT BREAST IN FEMALE, ESTROGEN RECEPTOR POSITIVE (HCC): Primary | ICD-10-CM

## 2022-08-29 DIAGNOSIS — R53.83 OTHER FATIGUE: ICD-10-CM

## 2022-08-29 DIAGNOSIS — Z78.0 ASYMPTOMATIC MENOPAUSE: ICD-10-CM

## 2022-08-29 DIAGNOSIS — C50.911 BREAST CANCER METASTASIZED TO AXILLARY LYMPH NODE, RIGHT (HCC): ICD-10-CM

## 2022-08-29 DIAGNOSIS — C50.811 MALIGNANT NEOPLASM OF OVERLAPPING SITES OF RIGHT BREAST IN FEMALE, ESTROGEN RECEPTOR POSITIVE (HCC): Primary | ICD-10-CM

## 2022-08-29 LAB
ALBUMIN SERPL-MCNC: 3.6 G/DL (ref 3.4–5)
ALBUMIN/GLOB SERPL: 1 {RATIO} (ref 1–2)
ALP LIVER SERPL-CCNC: 50 U/L
ALT SERPL-CCNC: 14 U/L
ANION GAP SERPL CALC-SCNC: 4 MMOL/L (ref 0–18)
AST SERPL-CCNC: 10 U/L (ref 15–37)
BASOPHILS # BLD AUTO: 0.04 X10(3) UL (ref 0–0.2)
BASOPHILS NFR BLD AUTO: 0.5 %
BILIRUB SERPL-MCNC: 0.4 MG/DL (ref 0.1–2)
BUN BLD-MCNC: 17 MG/DL (ref 7–18)
CALCIUM BLD-MCNC: 9.3 MG/DL (ref 8.5–10.1)
CHLORIDE SERPL-SCNC: 105 MMOL/L (ref 98–112)
CO2 SERPL-SCNC: 29 MMOL/L (ref 21–32)
CREAT BLD-MCNC: 1.01 MG/DL
EOSINOPHIL # BLD AUTO: 0.09 X10(3) UL (ref 0–0.7)
EOSINOPHIL NFR BLD AUTO: 1.2 %
ERYTHROCYTE [DISTWIDTH] IN BLOOD BY AUTOMATED COUNT: 14 %
FASTING STATUS PATIENT QL REPORTED: NO
GFR SERPLBLD BASED ON 1.73 SQ M-ARVRAT: 62 ML/MIN/1.73M2 (ref 60–?)
GLOBULIN PLAS-MCNC: 3.6 G/DL (ref 2.8–4.4)
GLUCOSE BLD-MCNC: 109 MG/DL (ref 70–99)
HCT VFR BLD AUTO: 39.5 %
HGB BLD-MCNC: 12.3 G/DL
IMM GRANULOCYTES # BLD AUTO: 0.02 X10(3) UL (ref 0–1)
IMM GRANULOCYTES NFR BLD: 0.3 %
LDH SERPL L TO P-CCNC: 184 U/L
LYMPHOCYTES # BLD AUTO: 1.27 X10(3) UL (ref 1–4)
LYMPHOCYTES NFR BLD AUTO: 16.9 %
MCH RBC QN AUTO: 29.9 PG (ref 26–34)
MCHC RBC AUTO-ENTMCNC: 31.1 G/DL (ref 31–37)
MCV RBC AUTO: 95.9 FL
MONOCYTES # BLD AUTO: 0.6 X10(3) UL (ref 0.1–1)
MONOCYTES NFR BLD AUTO: 8 %
NEUTROPHILS # BLD AUTO: 5.48 X10 (3) UL (ref 1.5–7.7)
NEUTROPHILS # BLD AUTO: 5.48 X10(3) UL (ref 1.5–7.7)
NEUTROPHILS NFR BLD AUTO: 73.1 %
OSMOLALITY SERPL CALC.SUM OF ELEC: 288 MOSM/KG (ref 275–295)
PLATELET # BLD AUTO: 202 10(3)UL (ref 150–450)
POTASSIUM SERPL-SCNC: 3.8 MMOL/L (ref 3.5–5.1)
PROT SERPL-MCNC: 7.2 G/DL (ref 6.4–8.2)
RBC # BLD AUTO: 4.12 X10(6)UL
SODIUM SERPL-SCNC: 138 MMOL/L (ref 136–145)
TSI SER-ACNC: 1.49 MIU/ML (ref 0.36–3.74)
WBC # BLD AUTO: 7.5 X10(3) UL (ref 4–11)

## 2022-08-29 PROCEDURE — 99214 OFFICE O/P EST MOD 30 MIN: CPT | Performed by: INTERNAL MEDICINE

## 2022-08-29 RX ORDER — ZOLEDRONIC ACID 5 MG/100ML
5 INJECTION, SOLUTION INTRAVENOUS ONCE
OUTPATIENT
Start: 2022-08-29

## 2022-08-29 NOTE — PROGRESS NOTES
Patient is here for follow up for breast cancer on anastrozole. She is feeling more fatigued recently in the past month. She sleeps well. She reports that her activity has not changed. She tries to eat well. She tested for covid about two weeks ago when she had upper respiratory symptoms. She was negative. She denies hot flashes. She has some joint pains in her fingers occasionally.       Education Record    Learner:  Patient    Disease / Diagnosis: Breast cancer    Barriers / Limitations:  None   Comments:    Method:  Discussion   Comments:    General Topics:  Side effects and symptom management   Comments:    Outcome:  Shows understanding   Comments:

## 2023-01-01 NOTE — PATIENT INSTRUCTIONS
POST-RADIATION INSTRUCTIONS:   - CALL [phone] FOR A FOLLOW-UP WITH DR. Laly Flores Cera as scheduled in December  - Daniella Correa as scheduled in January.  He will continue to monitor your Anastrozole therapy & unknown

## 2023-02-27 ENCOUNTER — HOSPITAL ENCOUNTER (OUTPATIENT)
Dept: BONE DENSITY | Age: 68
Discharge: HOME | End: 2023-02-27
Attending: INTERNAL MEDICINE
Payer: COMMERCIAL

## 2023-02-27 DIAGNOSIS — Z78.0 ASYMPTOMATIC MENOPAUSE: ICD-10-CM

## 2023-02-27 PROCEDURE — 77080 DXA BONE DENSITY AXIAL: CPT | Performed by: INTERNAL MEDICINE

## 2023-05-31 ENCOUNTER — OFFICE VISIT (OUTPATIENT)
Dept: HEMATOLOGY/ONCOLOGY | Facility: HOSPITAL | Age: 68
End: 2023-05-31
Attending: INTERNAL MEDICINE
Payer: COMMERCIAL

## 2023-05-31 VITALS
SYSTOLIC BLOOD PRESSURE: 149 MMHG | BODY MASS INDEX: 22.42 KG/M2 | HEIGHT: 57.72 IN | OXYGEN SATURATION: 96 % | TEMPERATURE: 98 F | HEART RATE: 86 BPM | RESPIRATION RATE: 20 BRPM | WEIGHT: 106.81 LBS | DIASTOLIC BLOOD PRESSURE: 87 MMHG

## 2023-05-31 DIAGNOSIS — T38.6X5A OSTEOPOROSIS DUE TO AROMATASE INHIBITOR: ICD-10-CM

## 2023-05-31 DIAGNOSIS — C50.811 MALIGNANT NEOPLASM OF OVERLAPPING SITES OF RIGHT BREAST IN FEMALE, ESTROGEN RECEPTOR POSITIVE (HCC): Primary | ICD-10-CM

## 2023-05-31 DIAGNOSIS — Z17.0 MALIGNANT NEOPLASM OF OVERLAPPING SITES OF RIGHT BREAST IN FEMALE, ESTROGEN RECEPTOR POSITIVE (HCC): Primary | ICD-10-CM

## 2023-05-31 DIAGNOSIS — C50.911 BREAST CANCER METASTASIZED TO AXILLARY LYMPH NODE, RIGHT (HCC): ICD-10-CM

## 2023-05-31 DIAGNOSIS — M81.8 OSTEOPOROSIS DUE TO AROMATASE INHIBITOR: ICD-10-CM

## 2023-05-31 DIAGNOSIS — C77.3 BREAST CANCER METASTASIZED TO AXILLARY LYMPH NODE, RIGHT (HCC): ICD-10-CM

## 2023-05-31 PROCEDURE — 99213 OFFICE O/P EST LOW 20 MIN: CPT | Performed by: INTERNAL MEDICINE

## 2023-05-31 NOTE — PROGRESS NOTES
Patient is here for follow up for breast cancer on anastrozole. She denies any hot flashes or joint aches. She denies any fever, cough or shortness of breath. Her appetite and energy are good. She needs an order for a mammogram. She was due for this a few months ago.      Education Record    Learner:  Patient    Disease / Diagnosis: Breast cancer    Barriers / Limitations:  None   Comments:    Method:  Discussion   Comments:    General Topics:  Side effects and symptom management   Comments:    Outcome:  Shows understanding   Comments:

## 2023-06-06 ENCOUNTER — APPOINTMENT (OUTPATIENT)
Dept: HEMATOLOGY/ONCOLOGY | Facility: HOSPITAL | Age: 68
End: 2023-06-06
Attending: INTERNAL MEDICINE
Payer: COMMERCIAL

## 2023-06-22 ENCOUNTER — OFFICE VISIT (OUTPATIENT)
Dept: HEMATOLOGY/ONCOLOGY | Facility: HOSPITAL | Age: 68
End: 2023-06-22
Attending: INTERNAL MEDICINE
Payer: COMMERCIAL

## 2023-06-22 VITALS
RESPIRATION RATE: 16 BRPM | DIASTOLIC BLOOD PRESSURE: 80 MMHG | HEART RATE: 63 BPM | WEIGHT: 107 LBS | TEMPERATURE: 98 F | HEIGHT: 57.72 IN | SYSTOLIC BLOOD PRESSURE: 154 MMHG | OXYGEN SATURATION: 99 % | BODY MASS INDEX: 22.46 KG/M2

## 2023-06-22 DIAGNOSIS — M81.8 OSTEOPOROSIS DUE TO AROMATASE INHIBITOR: Primary | ICD-10-CM

## 2023-06-22 DIAGNOSIS — Z17.0 MALIGNANT NEOPLASM OF OVERLAPPING SITES OF RIGHT BREAST IN FEMALE, ESTROGEN RECEPTOR POSITIVE (HCC): ICD-10-CM

## 2023-06-22 DIAGNOSIS — T38.6X5A OSTEOPOROSIS DUE TO AROMATASE INHIBITOR: Primary | ICD-10-CM

## 2023-06-22 DIAGNOSIS — C50.811 MALIGNANT NEOPLASM OF OVERLAPPING SITES OF RIGHT BREAST IN FEMALE, ESTROGEN RECEPTOR POSITIVE (HCC): ICD-10-CM

## 2023-06-22 DIAGNOSIS — C50.911 BREAST CANCER METASTASIZED TO AXILLARY LYMPH NODE, RIGHT (HCC): ICD-10-CM

## 2023-06-22 DIAGNOSIS — C77.3 BREAST CANCER METASTASIZED TO AXILLARY LYMPH NODE, RIGHT (HCC): ICD-10-CM

## 2023-06-22 DIAGNOSIS — M85.851 OSTEOPENIA OF NECKS OF BOTH FEMURS: ICD-10-CM

## 2023-06-22 DIAGNOSIS — M85.852 OSTEOPENIA OF NECKS OF BOTH FEMURS: ICD-10-CM

## 2023-06-22 LAB
ALBUMIN SERPL-MCNC: 3.7 G/DL (ref 3.4–5)
CALCIUM BLD-MCNC: 9.7 MG/DL (ref 8.5–10.1)
CREAT BLD-MCNC: 1.21 MG/DL
GFR SERPLBLD BASED ON 1.73 SQ M-ARVRAT: 50 ML/MIN/1.73M2 (ref 60–?)

## 2023-06-22 PROCEDURE — 96365 THER/PROPH/DIAG IV INF INIT: CPT

## 2023-06-22 PROCEDURE — 82040 ASSAY OF SERUM ALBUMIN: CPT

## 2023-06-22 PROCEDURE — 82310 ASSAY OF CALCIUM: CPT

## 2023-06-22 PROCEDURE — 82565 ASSAY OF CREATININE: CPT

## 2023-06-22 RX ORDER — ZOLEDRONIC ACID 5 MG/100ML
5 INJECTION, SOLUTION INTRAVENOUS ONCE
Status: CANCELLED | OUTPATIENT
Start: 2023-06-22

## 2023-06-22 RX ORDER — ZOLEDRONIC ACID 5 MG/100ML
5 INJECTION, SOLUTION INTRAVENOUS ONCE
Status: COMPLETED | OUTPATIENT
Start: 2023-06-22 | End: 2023-06-22

## 2023-06-22 RX ADMIN — ZOLEDRONIC ACID 5 MG: 5 INJECTION, SOLUTION INTRAVENOUS at 10:53:00

## 2023-06-22 NOTE — PROGRESS NOTES
Education Record    Learner:  Patient    Disease / Diagnosis:reclast inf    Barriers / Limitations:  None   Comments:    Method:  Discussion   Comments:    General Topics:  Plan of care reviewed   Comments:    Outcome:  Shows understanding   Comments:tolerated infusion

## 2023-06-23 ENCOUNTER — HOSPITAL ENCOUNTER (OUTPATIENT)
Dept: MAMMOGRAPHY | Facility: HOSPITAL | Age: 68
Discharge: HOME | End: 2023-06-23
Attending: INTERNAL MEDICINE
Payer: COMMERCIAL

## 2023-06-23 DIAGNOSIS — C77.3 BREAST CANCER METASTASIZED TO AXILLARY LYMPH NODE, RIGHT (HCC): ICD-10-CM

## 2023-06-23 DIAGNOSIS — Z17.0 MALIGNANT NEOPLASM OF OVERLAPPING SITES OF RIGHT BREAST IN FEMALE, ESTROGEN RECEPTOR POSITIVE (HCC): ICD-10-CM

## 2023-06-23 DIAGNOSIS — M81.8 OSTEOPOROSIS DUE TO AROMATASE INHIBITOR: ICD-10-CM

## 2023-06-23 DIAGNOSIS — C50.911 BREAST CANCER METASTASIZED TO AXILLARY LYMPH NODE, RIGHT (HCC): ICD-10-CM

## 2023-06-23 DIAGNOSIS — T38.6X5A OSTEOPOROSIS DUE TO AROMATASE INHIBITOR: ICD-10-CM

## 2023-06-23 DIAGNOSIS — C50.811 MALIGNANT NEOPLASM OF OVERLAPPING SITES OF RIGHT BREAST IN FEMALE, ESTROGEN RECEPTOR POSITIVE (HCC): ICD-10-CM

## 2023-06-23 PROCEDURE — 77065 DX MAMMO INCL CAD UNI: CPT | Performed by: INTERNAL MEDICINE

## 2023-06-23 PROCEDURE — 77061 BREAST TOMOSYNTHESIS UNI: CPT | Performed by: INTERNAL MEDICINE

## 2023-10-31 RX ORDER — ANASTROZOLE 1 MG/1
1 TABLET ORAL DAILY
Qty: 90 TABLET | Refills: 3 | Status: SHIPPED | OUTPATIENT
Start: 2023-10-31

## 2024-01-05 ENCOUNTER — OFFICE VISIT (OUTPATIENT)
Dept: HEMATOLOGY/ONCOLOGY | Age: 69
End: 2024-01-05
Attending: INTERNAL MEDICINE
Payer: COMMERCIAL

## 2024-01-05 VITALS
TEMPERATURE: 98 F | SYSTOLIC BLOOD PRESSURE: 188 MMHG | BODY MASS INDEX: 22.14 KG/M2 | HEIGHT: 57.72 IN | WEIGHT: 105.5 LBS | RESPIRATION RATE: 16 BRPM | HEART RATE: 47 BPM | OXYGEN SATURATION: 97 % | DIASTOLIC BLOOD PRESSURE: 83 MMHG

## 2024-01-05 DIAGNOSIS — Z17.0 MALIGNANT NEOPLASM OF OVERLAPPING SITES OF RIGHT BREAST IN FEMALE, ESTROGEN RECEPTOR POSITIVE  (HCC): Primary | ICD-10-CM

## 2024-01-05 DIAGNOSIS — C50.811 MALIGNANT NEOPLASM OF OVERLAPPING SITES OF RIGHT BREAST IN FEMALE, ESTROGEN RECEPTOR POSITIVE  (HCC): Primary | ICD-10-CM

## 2024-01-05 PROCEDURE — 99213 OFFICE O/P EST LOW 20 MIN: CPT | Performed by: INTERNAL MEDICINE

## 2024-01-05 NOTE — PROGRESS NOTES
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    She continues on anastrozole 1 mg daily since 2020. She is doing well. She has good energy level. She has no fever or sweats. She has no pain. She has no dyspnea or cough.    She has had six cycles of TCHP. She completed a year of HP in 3/2021. She had right breast mastectomy with pathologic CR.    Pathology from 2020:  Final Diagnosis:   Right breast, right mastectomy:  -Breast tissue with treatment effect.    -No residual invasive carcinoma identified.  (See comment.)    -Tumor bed / biopsy site changes identified.    -Skin and nipple with no pathologic change.    -All margins of resection negative for carcinoma.  -Nine lymph nodes, negative for metastatic carcinoma (0/9).  -Treatment effect present within lymph nodes.       Review of Systems:   Constitutional: Negative for anorexia, fevers, chills, night sweats and weight loss.  Psychiatric: The patient's mood was calm and appropriate for this visit.  The pertinent positives and negatives were described. All other systems were negative.    PMH/PSH:  Past Medical History:   Diagnosis Date    Cancer of overlapping sites of right female breast ([facility])     rt breast    Personal history of antineoplastic chemotherapy     end, 2021       Past Surgical History:   Procedure  Laterality Date    BREAST BIOPSY      CHEMOTHERAPY  2020    neoadjuvent therapy    MASTECTOMY RIGHT      OTHER SURGICAL HISTORY           PORT, INDWELLING, IMP  2020    RADIATION RIGHT  10/2020       Family History Reviewed:  Family History   Problem Relation Age of Onset    Cancer Father 69        leukemia    Breast Cancer Self 64       Allergies:     No Known Allergies    Medications:   anastrozole 1 MG Oral Tab tab Take 1 tablet (1 mg total) by mouth daily. 90 tablet 3    Calcium Carbonate 600 MG Oral Tab Take 1 tablet (600 mg total) by mouth in the morning and 1 tablet (600 mg total) before bedtime.      cholecalciferol 50 MCG (2000 UT) Oral Cap Take 1 capsule (2,000 Units total) by mouth daily.         Vital Signs:      Height: 146.6 cm (4' 9.72\") (1455)  Weight: 47.9 kg (105 lb 8 oz) (1455)  BSA (Calculated - sq m): 1.38 sq meters (1455)  Pulse: 47 (1455)  BP: 188/83 (1455)  Temp: 97.5 °F (36.4 °C) (1455)  Do Not Use - Resp Rate: --  SpO2: 97 % (1455)    Performance Status:  ECOG 0: Fully active, able to carry on all pre-disease performance without restriction     Physical Examination:    Constitutional: Patient is alert and not in acute distress.  Respiratory: Clear to auscultation and percussion. No rales.  No wheezes.  Cardiovascular: Regular rate and rhythm. No murmurs.  Breast: The right chest has no skin or subcutaneous lesion. The left breast has no mass or skin lesion.  Gastrointestinal: Soft, non tender with good bowel sounds.  Musculoskeletal: No edema. No calf tenderness.  Skin: No suspicious skin lesion, no rash, no ulceration.  Lymphatics: There is no palpable lymphadenopathy throughout in the cervical, supraclavicular, or axillary regions.  Psychiatric: The patient's mood is calm and appropriate for this visit.       Labs reviewed at this visit:     Lab Results   Component Value Date    WBC 7.5 2022    RBC 4.12 2022     HGB 12.3 08/29/2022    HCT 39.5 08/29/2022    MCV 95.9 08/29/2022    MCH 29.9 08/29/2022    MCHC 31.1 08/29/2022    RDW 14.0 08/29/2022    .0 08/29/2022     Lab Results   Component Value Date     08/29/2022    K 3.8 08/29/2022     08/29/2022    CO2 29.0 08/29/2022    BUN 17 08/29/2022    CREATSERUM 1.21 (H) 06/22/2023     (H) 08/29/2022    CA 9.7 06/22/2023    ALKPHO 50 08/29/2022    ALT 14 08/29/2022    AST 10 (L) 08/29/2022    BILT 0.4 08/29/2022    ALB 3.7 06/22/2023    TP 7.2 08/29/2022       Radiologic imaging reviewed at this visit:    Mammogram on 6/23/2023:  BREAST COMPOSITION:  Scattered areas fibroglandular density.     FINDINGS:  No suspicious mass or microcalcification is seen. No architectural distortion is evident.     Impression   CONCLUSION:  Negative diagnostic mammogram of the left breast.     BI-RADS CATEGORY:    DIAGNOSTIC CATEGORY 1--NEGATIVE ASSESSMENT.       RECOMMENDATIONS:    ROUTINE MAMMOGRAM AND CLINICAL EVALUATION IN 12 MONTHS.         Bone Denisty Dexa on 2/27/2023:  LUMBAR SPINE ANALYSIS RESULTS:       Bone mineral density (BMD) (g/cm2):  1.039     Lumbar T-Score:  -0.1       % young normals:  99       % age matched controls:  122       Change from prior spine examination:  4.6*%                TOTAL HIP ANALYSIS RESULTS:         Bone mineral density (BMD) (g/cm2):  .902       Total Hip T-Score:  -0.3       % young normals:  96       % age matched controls:  114       Change from prior hip examination:  4.1%                FEMORAL NECK ANALYSIS RESULTS:         Bone mineral density (BMD) (g/cm2):  .674       Femoral neck T-Score:  -1.6       % young normals:  79       % age matched controls:  99       Change from prior hip examination:  6.4*%               ADDITIONAL FINDINGS:  No significant additional findings.  Ten year fracture risk for major osteoporotic fracture is 8.3% and hip fracture is 1.0%.          Assessment/Plan:     Locally advanced right breast  cancer:  Clinically she has at least 5 cm of disease  She has breast erythema on the right breast  She has positive yris involvement in the right axilla  Biopsy on 3/11/2020 shows Invasive ductal carcinoma with micropapillary features  Grade III  %  TX negative  Ki-67 60%  Her2 3+  TCHP x 6 from 4/2020 to 7/2020  Right breast mastectomy on 9/2/2020  Pathologic CR (0/9 nodes)  Anastrozole since 9/2020  Completed TP antibody in 3/2021     She is doing great with path CR after TCHP x 6. She completed a year of the TP antibody. She has ERIKA. She continue with anastrozole 1 mg daily. She was comfortable with this plan. I will see her at six month intervals. She will get mammogram on the left breast yearly.    Osteopenia in the femoral neck:  High risk on Anastrozole of worsening bone density:  Vitamin D deficiency    She has replaced her Vit D stores with the high dose weekly. She will take daily Vit D 2000 u daily. Continue calcium supplement. We will repeat the bone density dexa in 2025. Based on this repeat we will decide on further management.      Rudy Cheng MD

## 2024-01-05 NOTE — PROGRESS NOTES
Patient is here for follow up for breast cancer on anastrozole.  She is tolerating this without complaints.    Her appetite and energy are good.  She denies any fever, cough or shortness of breath.     Education Record    Learner:  Patient    Disease / Diagnosis: Breast cancer    Barriers / Limitations:  None   Comments:    Method:  Discussion   Comments:    General Topics:  Side effects and symptom management   Comments:    Outcome:  Shows understanding   Comments:

## 2024-09-26 ENCOUNTER — OFFICE VISIT (OUTPATIENT)
Dept: HEMATOLOGY/ONCOLOGY | Age: 69
End: 2024-09-26
Attending: INTERNAL MEDICINE
Payer: COMMERCIAL

## 2024-09-26 VITALS
BODY MASS INDEX: 20.43 KG/M2 | HEIGHT: 57.72 IN | RESPIRATION RATE: 18 BRPM | DIASTOLIC BLOOD PRESSURE: 73 MMHG | OXYGEN SATURATION: 98 % | WEIGHT: 97.31 LBS | TEMPERATURE: 98 F | HEART RATE: 52 BPM | SYSTOLIC BLOOD PRESSURE: 166 MMHG

## 2024-09-26 DIAGNOSIS — R63.4 WEIGHT LOSS: ICD-10-CM

## 2024-09-26 DIAGNOSIS — Z12.31 BREAST CANCER SCREENING BY MAMMOGRAM: ICD-10-CM

## 2024-09-26 DIAGNOSIS — Z17.0 MALIGNANT NEOPLASM OF OVERLAPPING SITES OF RIGHT BREAST IN FEMALE, ESTROGEN RECEPTOR POSITIVE (HCC): Primary | ICD-10-CM

## 2024-09-26 DIAGNOSIS — C50.811 MALIGNANT NEOPLASM OF OVERLAPPING SITES OF RIGHT BREAST IN FEMALE, ESTROGEN RECEPTOR POSITIVE (HCC): Primary | ICD-10-CM

## 2024-09-26 DIAGNOSIS — Z78.0 ASYMPTOMATIC MENOPAUSE: ICD-10-CM

## 2024-09-26 LAB
ALBUMIN SERPL-MCNC: 4 G/DL (ref 3.4–5)
ALBUMIN/GLOB SERPL: 1.1 {RATIO} (ref 1–2)
ALP LIVER SERPL-CCNC: 53 U/L
ALT SERPL-CCNC: 15 U/L
ANION GAP SERPL CALC-SCNC: 3 MMOL/L (ref 0–18)
AST SERPL-CCNC: 17 U/L (ref 15–37)
BASOPHILS # BLD AUTO: 0.03 X10(3) UL (ref 0–0.2)
BASOPHILS NFR BLD AUTO: 0.4 %
BILIRUB SERPL-MCNC: 0.4 MG/DL (ref 0.1–2)
BUN BLD-MCNC: 16 MG/DL (ref 9–23)
CALCIUM BLD-MCNC: 10.1 MG/DL (ref 8.5–10.1)
CHLORIDE SERPL-SCNC: 104 MMOL/L (ref 98–112)
CO2 SERPL-SCNC: 32 MMOL/L (ref 21–32)
CREAT BLD-MCNC: 0.91 MG/DL
EGFRCR SERPLBLD CKD-EPI 2021: 69 ML/MIN/1.73M2 (ref 60–?)
EOSINOPHIL # BLD AUTO: 0.12 X10(3) UL (ref 0–0.7)
EOSINOPHIL NFR BLD AUTO: 1.4 %
ERYTHROCYTE [DISTWIDTH] IN BLOOD BY AUTOMATED COUNT: 14.2 %
FASTING STATUS PATIENT QL REPORTED: NO
GLOBULIN PLAS-MCNC: 3.5 G/DL (ref 2.8–4.4)
GLUCOSE BLD-MCNC: 84 MG/DL (ref 70–99)
HCT VFR BLD AUTO: 42.6 %
HGB BLD-MCNC: 14.3 G/DL
IMM GRANULOCYTES # BLD AUTO: 0.01 X10(3) UL (ref 0–1)
IMM GRANULOCYTES NFR BLD: 0.1 %
LDH SERPL L TO P-CCNC: 179 U/L
LYMPHOCYTES # BLD AUTO: 1.72 X10(3) UL (ref 1–4)
LYMPHOCYTES NFR BLD AUTO: 20.2 %
MCH RBC QN AUTO: 30.7 PG (ref 26–34)
MCHC RBC AUTO-ENTMCNC: 33.6 G/DL (ref 31–37)
MCV RBC AUTO: 91.4 FL
MONOCYTES # BLD AUTO: 0.55 X10(3) UL (ref 0.1–1)
MONOCYTES NFR BLD AUTO: 6.5 %
NEUTROPHILS # BLD AUTO: 6.07 X10 (3) UL (ref 1.5–7.7)
NEUTROPHILS # BLD AUTO: 6.07 X10(3) UL (ref 1.5–7.7)
NEUTROPHILS NFR BLD AUTO: 71.4 %
OSMOLALITY SERPL CALC.SUM OF ELEC: 288 MOSM/KG (ref 275–295)
PLATELET # BLD AUTO: 212 10(3)UL (ref 150–450)
POTASSIUM SERPL-SCNC: 3.9 MMOL/L (ref 3.5–5.1)
PROT SERPL-MCNC: 7.5 G/DL (ref 6.4–8.2)
RBC # BLD AUTO: 4.66 X10(6)UL
SODIUM SERPL-SCNC: 139 MMOL/L (ref 136–145)
WBC # BLD AUTO: 8.5 X10(3) UL (ref 4–11)

## 2024-09-26 PROCEDURE — 99214 OFFICE O/P EST MOD 30 MIN: CPT | Performed by: INTERNAL MEDICINE

## 2024-09-26 NOTE — PROGRESS NOTES
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    She has had some weight loss since last visit. She has had some constipation. She has had some intermittent abdominal bloating. She has gained about 2 to 3 lbs in the last month. She thinks her diet is back to normal and she no longer has abdominal symptoms. She has no pain. She has no dyspnea or cough. She has good energy level.    She continues on anastrozole 1 mg daily since 2020. She is doing well.     She has had six cycles of TCHP. She completed a year of HP in 3/2021. She had right breast mastectomy with pathologic CR.    Pathology from 2020:  Final Diagnosis:   Right breast, right mastectomy:  -Breast tissue with treatment effect.    -No residual invasive carcinoma identified.  (See comment.)    -Tumor bed / biopsy site changes identified.    -Skin and nipple with no pathologic change.    -All margins of resection negative for carcinoma.  -Nine lymph nodes, negative for metastatic carcinoma (0/9).  -Treatment effect present within lymph nodes.       Review of Systems:   Constitutional: Negative for anorexia, fevers, chills, night sweats and weight loss.  Psychiatric: The patient's mood was calm and appropriate for this visit.  The pertinent positives and negatives were described. All other systems were  negative.    PMH/PSH:  Past Medical History:    Cancer of overlapping sites of right female breast (HCC)    rt breast    Personal history of antineoplastic chemotherapy    end, 2021       Past Surgical History:   Procedure Laterality Date    Breast biopsy      Chemotherapy  2020    neoadjuvent therapy    Mastectomy right      Other surgical history           Port, indwelling, imp  2020    Radiation right  10/2020       Family History Reviewed:  Family History   Problem Relation Age of Onset    Cancer Father 69        leukemia    Breast Cancer Self 64       Allergies:     No Known Allergies    Medications:  No outpatient medications have been marked as taking for the 24 encounter (Office Visit) with Rudy Cheng MD.       Vital Signs:      Height: 146.6 cm (4' 9.72\") (1147)  Weight: 44.1 kg (97 lb 4.8 oz) (1147)  BSA (Calculated - sq m): 1.34 sq meters (1147)  Pulse: 52 (1147)  BP: 166/73 (1147)  Temp: 98.2 °F (36.8 °C) (1147)  Do Not Use - Resp Rate: --  SpO2: 98 % (1147)    Performance Status:  ECOG 0: Fully active, able to carry on all pre-disease performance without restriction     Physical Examination:    Constitutional: Patient is alert and not in acute distress.  Respiratory: Clear to auscultation and percussion. No rales.  No wheezes.  Cardiovascular: Regular rate and rhythm. No murmurs.  Breast: The right chest has no skin or subcutaneous lesion. The left breast has no mass or skin lesion.  Gastrointestinal: Soft, non tender with good bowel sounds.  Musculoskeletal: No edema. No calf tenderness.  Skin: No suspicious skin lesion, no rash, no ulceration.  Lymphatics: There is no palpable lymphadenopathy throughout in the cervical, supraclavicular, or axillary regions.  Psychiatric: The patient's mood is calm and appropriate for this visit.       Labs reviewed at this visit:     Lab Results   Component Value Date    WBC 8.5 2024     RBC 4.66 09/26/2024    HGB 14.3 09/26/2024    HCT 42.6 09/26/2024    MCV 91.4 09/26/2024    MCH 30.7 09/26/2024    MCHC 33.6 09/26/2024    RDW 14.2 09/26/2024    .0 09/26/2024     Lab Results   Component Value Date     09/26/2024    K 3.9 09/26/2024     09/26/2024    CO2 32.0 09/26/2024    BUN 16 09/26/2024    CREATSERUM 0.91 09/26/2024    GLU 84 09/26/2024    CA 10.1 09/26/2024    ALKPHO 53 (L) 09/26/2024    ALT 15 09/26/2024    AST 17 09/26/2024    BILT 0.4 09/26/2024    ALB 4.0 09/26/2024    TP 7.5 09/26/2024     Lab Component   Component Value Date/Time     09/26/2024 1318        Radiologic imaging reviewed at this visit:    Mammogram on 6/23/2023:  BREAST COMPOSITION:  Scattered areas fibroglandular density.     FINDINGS:  No suspicious mass or microcalcification is seen. No architectural distortion is evident.     Impression   CONCLUSION:  Negative diagnostic mammogram of the left breast.     BI-RADS CATEGORY:    DIAGNOSTIC CATEGORY 1--NEGATIVE ASSESSMENT.       RECOMMENDATIONS:    ROUTINE MAMMOGRAM AND CLINICAL EVALUATION IN 12 MONTHS.         Bone Denisty Dexa on 2/27/2023:  LUMBAR SPINE ANALYSIS RESULTS:       Bone mineral density (BMD) (g/cm2):  1.039     Lumbar T-Score:  -0.1       % young normals:  99       % age matched controls:  122       Change from prior spine examination:  4.6*%                TOTAL HIP ANALYSIS RESULTS:         Bone mineral density (BMD) (g/cm2):  .902       Total Hip T-Score:  -0.3       % young normals:  96       % age matched controls:  114       Change from prior hip examination:  4.1%                FEMORAL NECK ANALYSIS RESULTS:         Bone mineral density (BMD) (g/cm2):  .674       Femoral neck T-Score:  -1.6       % young normals:  79       % age matched controls:  99       Change from prior hip examination:  6.4*%               ADDITIONAL FINDINGS:  No significant additional findings.  Ten year fracture risk for major osteoporotic fracture is  8.3% and hip fracture is 1.0%.          Assessment/Plan:     Locally advanced right breast cancer:  Clinically she has at least 5 cm of disease  She has breast erythema on the right breast  She has positive yris involvement in the right axilla  Biopsy on 3/11/2020 shows Invasive ductal carcinoma with micropapillary features  Grade III  %  ND negative  Ki-67 60%  Her2 3+  TCHP x 6 from 4/2020 to 7/2020  Right breast mastectomy on 9/2/2020  Pathologic CR (0/9 nodes)  Anastrozole since 9/2020  Completed TP antibody in 3/2021     She is doing great with path CR after TCHP x 6. She had transient weight loss but she clinical feels back to normal. I sent labs that are normal. She will call me if she has recurrent weight loss.     She completed a year of the TP antibody. She has ERIKA. She continue with anastrozole 1 mg daily. She was comfortable with this plan. I will see her at six month intervals. She will get mammogram on the left breast yearly.    Osteopenia in the femoral neck:  High risk on Anastrozole of worsening bone density:  Vitamin D deficiency    She has replaced her Vit D stores with the high dose weekly. She will take daily Vit D 2000 u daily. Continue calcium supplement. We will repeat the bone density dexa in 2025. Based on this repeat we will decide on further management.      Rudy Cheng MD

## 2024-10-15 ENCOUNTER — HOSPITAL ENCOUNTER (OUTPATIENT)
Dept: MAMMOGRAPHY | Facility: HOSPITAL | Age: 69
Discharge: HOME | End: 2024-10-15
Attending: INTERNAL MEDICINE
Payer: COMMERCIAL

## 2024-10-15 DIAGNOSIS — Z12.31 BREAST CANCER SCREENING BY MAMMOGRAM: ICD-10-CM

## 2024-10-15 DIAGNOSIS — Z17.0 MALIGNANT NEOPLASM OF OVERLAPPING SITES OF RIGHT BREAST IN FEMALE, ESTROGEN RECEPTOR POSITIVE (HCC): ICD-10-CM

## 2024-10-15 DIAGNOSIS — C50.811 MALIGNANT NEOPLASM OF OVERLAPPING SITES OF RIGHT BREAST IN FEMALE, ESTROGEN RECEPTOR POSITIVE (HCC): ICD-10-CM

## 2024-10-15 PROCEDURE — 77067 SCR MAMMO BI INCL CAD: CPT | Performed by: INTERNAL MEDICINE

## 2024-10-15 PROCEDURE — 77063 BREAST TOMOSYNTHESIS BI: CPT | Performed by: INTERNAL MEDICINE

## 2024-10-15 RX ORDER — ANASTROZOLE 1 MG/1
1 TABLET ORAL DAILY
Qty: 90 TABLET | Refills: 3 | Status: SHIPPED | OUTPATIENT
Start: 2024-10-15

## 2025-03-26 ENCOUNTER — HOSPITAL ENCOUNTER (OUTPATIENT)
Dept: BONE DENSITY | Age: 70
Discharge: HOME | End: 2025-03-26
Attending: INTERNAL MEDICINE
Payer: COMMERCIAL

## 2025-03-26 DIAGNOSIS — Z78.0 ASYMPTOMATIC MENOPAUSE: ICD-10-CM

## 2025-03-26 DIAGNOSIS — Z17.0 MALIGNANT NEOPLASM OF OVERLAPPING SITES OF RIGHT BREAST IN FEMALE, ESTROGEN RECEPTOR POSITIVE (HCC): ICD-10-CM

## 2025-03-26 DIAGNOSIS — C50.811 MALIGNANT NEOPLASM OF OVERLAPPING SITES OF RIGHT BREAST IN FEMALE, ESTROGEN RECEPTOR POSITIVE (HCC): ICD-10-CM

## 2025-03-26 PROCEDURE — 77080 DXA BONE DENSITY AXIAL: CPT | Performed by: INTERNAL MEDICINE

## 2025-04-11 ENCOUNTER — OFFICE VISIT (OUTPATIENT)
Age: 70
End: 2025-04-11
Attending: INTERNAL MEDICINE
Payer: COMMERCIAL

## 2025-04-11 VITALS
BODY MASS INDEX: 20.19 KG/M2 | SYSTOLIC BLOOD PRESSURE: 193 MMHG | DIASTOLIC BLOOD PRESSURE: 84 MMHG | HEIGHT: 57.72 IN | HEART RATE: 43 BPM | OXYGEN SATURATION: 97 % | RESPIRATION RATE: 16 BRPM | TEMPERATURE: 97 F | WEIGHT: 96.19 LBS

## 2025-04-11 DIAGNOSIS — C50.811 MALIGNANT NEOPLASM OF OVERLAPPING SITES OF RIGHT BREAST IN FEMALE, ESTROGEN RECEPTOR POSITIVE (HCC): Primary | ICD-10-CM

## 2025-04-11 DIAGNOSIS — Z17.0 MALIGNANT NEOPLASM OF OVERLAPPING SITES OF RIGHT BREAST IN FEMALE, ESTROGEN RECEPTOR POSITIVE (HCC): Primary | ICD-10-CM

## 2025-04-11 RX ORDER — ZOLEDRONIC ACID 0.05 MG/ML
5 INJECTION, SOLUTION INTRAVENOUS ONCE
OUTPATIENT
Start: 2025-04-11

## 2025-04-11 NOTE — PROGRESS NOTES
Patient here for 6 month f/u for breast cancer. Patient taking anastrozole as directed with no c/o of side effects. Patient completed dexa scan on 3/26. Patient will have breast exam today and declined chaperone.     Education Record    Learner:  Patient    Disease / Diagnosis: breast exam    Barriers / Limitations:  None   Comments:    Method:  Discussion   Comments:    General Topics:  Medication, Side effects and symptom management, and Plan of care reviewed   Comments:    Outcome:  Shows understanding   Comments:

## 2025-04-11 NOTE — PROGRESS NOTES
Cancer Center Progress Note    Problem List:      Problem List[1]      History of Present Illness  Ms. Sheila Strong is a 69 year old female with a history of right breast mastectomy and chemotherapy who presents for routine follow-up.    She underwent a right breast mastectomy on September 2, 2020, following six cycles of TCHP chemotherapy, achieving a pathologic complete response. Post-mastectomy, she continued with trastuzumab and pertuzumab for a year and has been on anastrozole 1 mg daily since September 2020. Her last visit was on September 26, 2024. Her last mammogram in October was negative for the left breast. No new pain, breathing problems, or weight loss. Appetite, strength, and stamina are stable. She takes calcium and vitamin D supplements in addition to anastrozole.    Recent blood work, including CBC, chemistry panel, and LDH protein, were normal. A bone density scan showed normal lumbar spine and total hip, but mild osteopenia in the femoral neck, which has worsened by 3.7% since the last scan. She has previously received Reclast infusions, which have helped maintain bone density.    Routine health maintenance is up to date. Last mammogram in October was negative for the left breast. Blood work from last September was normal, including CBC, chemistry panel, and LDH protein.    She is planning to retire at the end of June and will be visiting her mother in [place], who is turning 89. She also has two graduations to attend in May for her daughter and son.      Review of Systems:   Constitutional: Negative for anorexia, fatigue, fevers, chills, night sweats and weight loss.  Eyes: Negative for visual disturbance, irritation and redness.  Respiratory: Negative for cough, hemoptysis, chest pain, or dyspnea.  Cardiovascular: Negative for angina, orthopnea or palpitations.  Gastrointestinal: Negative for nausea, vomiting, change in bowel habits, diarrhea, constipation and abdominal  pain.  Integument/breast: Negative for rash, skin lesions, and pruritus.  Hematologic/lymphatic: Negative for easy bruising, bleeding, and lymphadenopathy.  Musculoskeletal: Negative for myalgias, arthralgias, muscle weakness.  Genitourinary: Negative for dysuria or hematuria  Psychiatric: The patient's mood was calm and appropriate for this visit.  The pertinent positives and negatives were described. All other systems were negative.    PMH/PSH:  Past Medical History[2]    Past Surgical History[3]    Family History Reviewed:  Family History[4]    Allergies:     Allergies[5]    Medications:  Current Medications[6]       Vital Signs:      Height: 146.6 cm (4' 9.72\") (04/11 1051)  Weight: 43.6 kg (96 lb 3.2 oz) (04/11 1051)  BSA (Calculated - sq m): 1.33 sq meters (04/11 1051)  Pulse: 43 (04/11 1051)  BP: 193/84 (04/11 1051)  Temp: 97.1 °F (36.2 °C) (04/11 1051)  Do Not Use - Resp Rate: --  SpO2: 97 % (04/11 1051)      Performance Status:  ECOG 0: Fully active, able to carry on all pre-disease performance without restriction     Physical Examination:      Constitutional: Patient is alert and oriented x 3, not in acute distress.  Eyes: Anicteric sclera, pink conjunctiva.  HEENT:  Oropharynx is clear. Neck is supple.  Respiratory: Clear to auscultation and percussion. No rales.  No wheezes.  Cardiovascular: Regular rate and rhythm. No murmurs.  Breast: no masses or skin lesion  Gastrointestinal: Soft, non tender with good bowel sounds.  Musculoskeletal: No edema. No calf tenderness.  Skin: No suspicious skin lesion, no rash, no ulceration.  Lymphatics: There is no palpable lymphadenopathy throughout in the cervical, supraclavicular, or axillary regions.  Psychiatric: The patient's mood is calm and appropriate for this visit.       Results reviewed at this visit:     Lab Results   Component Value Date    WBC 8.5 09/26/2024    RBC 4.66 09/26/2024    HGB 14.3 09/26/2024    HCT 42.6 09/26/2024    MCV 91.4 09/26/2024    MCH  30.7 09/26/2024    MCHC 33.6 09/26/2024    RDW 14.2 09/26/2024    .0 09/26/2024     Lab Results   Component Value Date     09/26/2024    K 3.9 09/26/2024     09/26/2024    CO2 32.0 09/26/2024    BUN 16 09/26/2024    CREATSERUM 0.91 09/26/2024    GLU 84 09/26/2024    CA 10.1 09/26/2024    ALKPHO 53 (L) 09/26/2024    ALT 15 09/26/2024    AST 17 09/26/2024    BILT 0.4 09/26/2024    ALB 4.0 09/26/2024    TP 7.5 09/26/2024       Results  LABS  CBC: normal (09/2024)  Chemistry panel: normal (09/2024)  LDH: normal (09/2024)    RADIOLOGY  Bone density: Lumbar spine normal, Total hip normal, Femoral neck osteopenia, 3.7% worse since last (04/2025)  Mammogram: Negative left breast (10/2024)         Assessment & Plan  Locally advanced right breast cancer:  Clinically she has at least 5 cm of disease  She has breast erythema on the right breast  She has positive yris involvement in the right axilla  Biopsy on 3/11/2020 shows Invasive ductal carcinoma with micropapillary features  Grade III  %  NE negative  Ki-67 60%  Her2 3+  TCHP x 6 from 4/2020 to 7/2020  Right breast mastectomy on 9/2/2020  Pathologic CR (0/9 nodes)  Anastrozole since 9/2020  Completed TP antibody in 3/2021     Five years post-right breast mastectomy with pathologic complete response, indicating a very good prognosis. No new symptoms such as pain or dyspnea. Recent mammogram of the left breast was negative. Continued anastrozole therapy since September 2020. Discussion on therapy duration beyond five years will occur at the next visit.  - Continue anastrozole 1 mg orally daily  - Follow-up in six months to discuss five-year versus extended therapy    Osteopenia of necks of both femurs  Bone density shows mild osteopenia in the femoral neck, minimally worsened by 3.7% since the last scan. Lumbar spine and total hip are within normal range. Previous Reclast infusion has been beneficial in maintaining bone density and may have  potential anti-cancer effects. Decision to continue Reclast due to its benefits in maintaining bone density and potential anti-cancer effects.  - Schedule Reclast infusion  - Follow-up in six months           The following individual(s) verbally consented to be recorded using ambient AI listening technology and understand that they can each withdraw their consent to this listening technology at any point by asking the clinician to turn off or pause the recording:    Patient name: Sheila Strong      Rudy Cheng MD          [1]   Patient Active Problem List  Diagnosis    Malignant neoplasm of overlapping sites of right breast in female, estrogen receptor positive (HCC)    Breast cancer metastasized to axillary lymph node, right (HCC)    Breast erythema    Elevated alkaline phosphatase in     Elevated LFTs    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    Anemia complicating neoplastic disease    Osteopenia of necks of both femurs    Osteoporosis due to aromatase inhibitor    Long term current use of aromatase inhibitor   [2]   Past Medical History:   Cancer of overlapping sites of right female breast (HCC)    rt breast    Personal history of antineoplastic chemotherapy    end, 2021   [3]   Past Surgical History:  Procedure Laterality Date    Breast biopsy      Chemotherapy  2020    neoadjuvent therapy    Mastectomy right      Other surgical history           Port, indwelling, imp  2020    Radiation right  10/2020   [4]   Family History  Problem Relation Age of Onset    Cancer Father 69        leukemia    Breast Cancer Self 64   [5] No Known Allergies  [6]    ANASTROZOLE 1 MG Oral Tab tab TAKE 1 TABLET BY MOUTH EVERY DAY 90 tablet 3    Calcium Carbonate 600 MG Oral Tab Take 1 tablet (600 mg total) by mouth in the morning and 1 tablet (600 mg total) before bedtime.      cholecalciferol 50 MCG (2000 UT) Oral Cap Take 1 capsule (2,000 Units total) by mouth in the morning.

## 2025-04-22 ENCOUNTER — TELEPHONE (OUTPATIENT)
Facility: LOCATION | Age: 70
End: 2025-04-22

## 2025-04-29 ENCOUNTER — OFFICE VISIT (OUTPATIENT)
Age: 70
End: 2025-04-29
Attending: INTERNAL MEDICINE
Payer: COMMERCIAL

## 2025-04-29 VITALS
WEIGHT: 96 LBS | RESPIRATION RATE: 16 BRPM | BODY MASS INDEX: 20.15 KG/M2 | OXYGEN SATURATION: 98 % | DIASTOLIC BLOOD PRESSURE: 54 MMHG | HEART RATE: 44 BPM | HEIGHT: 57.72 IN | SYSTOLIC BLOOD PRESSURE: 137 MMHG

## 2025-04-29 DIAGNOSIS — M85.852 OSTEOPENIA OF NECKS OF BOTH FEMURS: ICD-10-CM

## 2025-04-29 DIAGNOSIS — C50.811 MALIGNANT NEOPLASM OF OVERLAPPING SITES OF RIGHT BREAST IN FEMALE, ESTROGEN RECEPTOR POSITIVE (HCC): ICD-10-CM

## 2025-04-29 DIAGNOSIS — Z17.0 MALIGNANT NEOPLASM OF OVERLAPPING SITES OF RIGHT BREAST IN FEMALE, ESTROGEN RECEPTOR POSITIVE (HCC): ICD-10-CM

## 2025-04-29 DIAGNOSIS — T38.6X5A OSTEOPOROSIS DUE TO AROMATASE INHIBITOR: ICD-10-CM

## 2025-04-29 DIAGNOSIS — C77.3 BREAST CANCER METASTASIZED TO AXILLARY LYMPH NODE, RIGHT (HCC): Primary | ICD-10-CM

## 2025-04-29 DIAGNOSIS — M81.8 OSTEOPOROSIS DUE TO AROMATASE INHIBITOR: ICD-10-CM

## 2025-04-29 DIAGNOSIS — M85.851 OSTEOPENIA OF NECKS OF BOTH FEMURS: ICD-10-CM

## 2025-04-29 DIAGNOSIS — C50.911 BREAST CANCER METASTASIZED TO AXILLARY LYMPH NODE, RIGHT (HCC): Primary | ICD-10-CM

## 2025-04-29 LAB
ALBUMIN SERPL-MCNC: 4.4 G/DL (ref 3.2–4.8)
CALCIUM BLD-MCNC: 9.4 MG/DL (ref 8.7–10.6)
CREAT BLD-MCNC: 1.19 MG/DL (ref 0.55–1.02)
EGFRCR SERPLBLD CKD-EPI 2021: 50 ML/MIN/1.73M2 (ref 60–?)

## 2025-04-29 RX ORDER — ZOLEDRONIC ACID 0.05 MG/ML
5 INJECTION, SOLUTION INTRAVENOUS ONCE
Status: COMPLETED | OUTPATIENT
Start: 2025-04-29 | End: 2025-04-29

## 2025-04-29 RX ORDER — ZOLEDRONIC ACID 0.05 MG/ML
5 INJECTION, SOLUTION INTRAVENOUS ONCE
Status: CANCELLED | OUTPATIENT
Start: 2025-04-29

## 2025-04-29 RX ADMIN — ZOLEDRONIC ACID 5 MG: 0.05 INJECTION, SOLUTION INTRAVENOUS at 15:13:00

## 2025-04-29 NOTE — PROGRESS NOTES
Education Record    Learner:  Patient    Disease / Diagnosis:    Barriers / Limitations:  None   Comments:    Method:  Discussion   Comments:    General Topics:  Plan of care reviewed   Comments:    Outcome:  Shows understanding   Comments:    Patient here for labs and reclast - ok to proceed with labs per MD. No further appointments scheduled today per patient request. Patient left in stable condition.

## (undated) DEVICE — POWERLOC MAX 20G X 1 INCH WITH Y-SITE: Brand: PORT ACCESS NEEDLE

## (undated) DEVICE — DRAIN RESERVOIR RELIAVAC 100CC

## (undated) DEVICE — BREAST-HERNIA-PORT CDS-LF: Brand: MEDLINE INDUSTRIES, INC.

## (undated) DEVICE — KENDALL SCD EXPRESS SLEEVES, KNEE LENGTH, MEDIUM: Brand: KENDALL SCD

## (undated) DEVICE — SUPER SPONGES,MEDIUM: Brand: KERLIX

## (undated) DEVICE — CONT SPEC SURG FAXITRON WEDGE

## (undated) DEVICE — SUTURE ETHILON 3-0 FS-1

## (undated) DEVICE — SUTURE ETHILON 3-0 PS-1

## (undated) DEVICE — STERILE SYNTHETIC POLYISOPRENE POWDER-FREE SURGICAL GLOVES WITH HYDROGEL COATING: Brand: PROTEXIS

## (undated) DEVICE — CHLORAPREP 26ML APPLICATOR

## (undated) DEVICE — #15 STERILE STAINLESS BLADE: Brand: STERILE STAINLESS BLADES

## (undated) DEVICE — UNDYED BRAIDED (POLYGLACTIN 910), SYNTHETIC ABSORBABLE SUTURE: Brand: COATED VICRYL

## (undated) DEVICE — 3M(TM) MICROPORE TAPE DISPENSER 1535-2: Brand: 3M™ MICROPORE™

## (undated) DEVICE — SUTURE SILK 2-0 SH

## (undated) DEVICE — Device

## (undated) DEVICE — SOL  .9 1000ML BTL

## (undated) DEVICE — SCD SLEEVE KNEE HI BLEND

## (undated) DEVICE — DRAPE C-ARM UNIVERSAL

## (undated) DEVICE — Device: Brand: PLUMEPEN

## (undated) DEVICE — #11 STERILE BLADE: Brand: POLYMER COATED BLADES

## (undated) DEVICE — SUTURE VICRYL 3-0

## (undated) DEVICE — SUTURE ETHIBOND 2-0 CT-2

## (undated) DEVICE — DRAIN CHANNEL 19FR BLAKE

## (undated) DEVICE — MINI LAP PACK-LF: Brand: MEDLINE INDUSTRIES, INC.

## (undated) DEVICE — SUTURE VICRYL 3-0 SH

## (undated) DEVICE — 3M™ STERI-STRIP™ REINFORCED ADHESIVE SKIN CLOSURES, R1547, 1/2 IN X 4 IN (12 MM X 100 MM), 6 STRIPS/ENVELOPE: Brand: 3M™ STERI-STRIP™

## (undated) DEVICE — GAUZE SPONGES,USP TYPE VII GAUZE, 12 PLY: Brand: CURITY

## (undated) DEVICE — LIGHT HANDLE

## (undated) NOTE — MR AVS SNAPSHOT
After Visit Summary   3/3/2020    Rick Quesada    MRN: [id number]           Visit Information     Date & Time  3/3/2020  3:40 PM Provider  Ariana Turcios RN Department  [facility] Mammography Dept.  Phone  [phone]      Allergies 9. Click Sign In. You can now view your medical record. Additional Information  If you have questions, you can call [phone] to talk to our [address] staff. Remember, [facility] is NOT to be used for urgent needs. For medical emergencies, dial 911. Treatment for acute illness   or injury that are   non-life-threatening.   Also available by appointment     Average cost  $70*       [address]   Monday – Friday  10:00 am – 1

## (undated) NOTE — LETTER
20    Patient: Forestine Shoulders  : 1955 Visit date: 2020    Dear  Ayla Hung MD    Thank you for referring Forestine Shoulders to my practice. Please find my assessment and plan below.         History of Present Illness  Sheila is She has seen Dr. Nate Marques and post mastectomy radiation is recommended.  Sheila will follow-up with Dr. Murray Slider after completion of radiation to discuss postmastectomy reconstruction  Sheila has seen Aaron Elena for genetic testing.  Genetic testing has been neg She is encouraged to continue monthly self examination and in annual examination by a physician. Technique for self examination was reviewed.   Kevin Arnold will follow up with me in 6 months for repeat examination  The patient will follow up with you for her rou

## (undated) NOTE — LETTER
20    Patient: Shireen Daniel  : 1955 Visit date: 2020    Dear  Dr. Anibal Acosta MD,    Thank you for referring Shireen Daniel to my practice. Please find my assessment and plan below.         History of Present Illness    Today, I am Sheila did speak with research RN, Lakshmi this morning.   She did receive information and will finalize her decision by the end of this week      Thank you for allowing me to participate in your patient's care             Sincerely,       Abhinav Archuleta MD

## (undated) NOTE — LETTER
20    Patient: Chi Tinsley  : 1955 Visit date: 2020    Dear  Dr. Megha Farr MD,    Thank you for referring Chi Tinsley to my practice. Please find my assessment and plan below.                   Sincerely,       Elise Lopez

## (undated) NOTE — Clinical Note
I saw [facility]. She has elevated LFT's this is worrisome for metastatic disease. She is getting a PET scan to assess for mets.  Lili Graham

## (undated) NOTE — LETTER
20    Patient: Joann Davis  : 1955 Visit date: 2020    Dear  Brisa Ferguson MD    Thank you for referring Joann Davis to my practice. Please find my assessment and plan below.     History of Present Illness    Today, I am recommended.  Sheila will follow-up with Dr. Leonel Santamaria after completion of radiation to discuss postmastectomy reconstruction  Sheila has seen Melinda Garcia for genetic testing.  Genetic testing has been negative.     Sheila did have bilateral breast MRI 7/23/2020

## (undated) NOTE — LETTER
21    Patient: Iven Barthel  : 1955 Visit date: 2021    Dear  Stephania Levy MD    Thank you for referring Iven Barthel to my practice. Please find my assessment and plan below.     History of Present Illness    Today, I am

## (undated) NOTE — LETTER
Printed: 2020    Patient Name: mEily Sadler  : 1955   Medical Record #: [id number]    Consent to [address], understand that I have been diagnosed with breast cancer (stage IIIA).     I understand that th the option of no treatment. I also understand that I may stop this treatment at any time. I have had the chance to ask questions about this treatment, and my questions have been answered to my satisfaction.   I understand that I can contact my oncologis

## (undated) NOTE — Clinical Note
Hi, just checking on if she is supposed to get reclast again. Only one dose given 12/10/2020 if I read that correctly. Just let me know.  thx